# Patient Record
Sex: FEMALE | Race: WHITE | NOT HISPANIC OR LATINO | Employment: FULL TIME | URBAN - METROPOLITAN AREA
[De-identification: names, ages, dates, MRNs, and addresses within clinical notes are randomized per-mention and may not be internally consistent; named-entity substitution may affect disease eponyms.]

---

## 2022-04-26 ENCOUNTER — APPOINTMENT (INPATIENT)
Dept: MRI IMAGING | Facility: HOSPITAL | Age: 27
DRG: 263 | End: 2022-04-26
Payer: COMMERCIAL

## 2022-04-26 ENCOUNTER — HOSPITAL ENCOUNTER (INPATIENT)
Facility: HOSPITAL | Age: 27
LOS: 3 days | Discharge: HOME | DRG: 263 | End: 2022-04-29
Attending: EMERGENCY MEDICINE | Admitting: INTERNAL MEDICINE
Payer: COMMERCIAL

## 2022-04-26 ENCOUNTER — APPOINTMENT (EMERGENCY)
Dept: ULTRASOUND IMAGING | Facility: HOSPITAL | Age: 27
DRG: 263 | End: 2022-04-26
Payer: COMMERCIAL

## 2022-04-26 DIAGNOSIS — K80.20 CHOLELITHIASIS: ICD-10-CM

## 2022-04-26 DIAGNOSIS — K81.9 CHOLECYSTITIS: Primary | ICD-10-CM

## 2022-04-26 DIAGNOSIS — R74.01 TRANSAMINITIS: ICD-10-CM

## 2022-04-26 DIAGNOSIS — E80.6 HYPERBILIRUBINEMIA: ICD-10-CM

## 2022-04-26 DIAGNOSIS — K80.50 CHOLEDOCHOLITHIASIS: ICD-10-CM

## 2022-04-26 PROBLEM — R10.9 ABDOMINAL PAIN: Status: ACTIVE | Noted: 2022-04-26

## 2022-04-26 LAB
ALBUMIN SERPL BCP-MCNC: 4 G/DL (ref 3.5–5)
ALP SERPL-CCNC: 205 U/L (ref 46–116)
ALT SERPL W P-5'-P-CCNC: 299 U/L (ref 12–78)
ANION GAP SERPL CALCULATED.3IONS-SCNC: 9 MMOL/L (ref 4–13)
AST SERPL W P-5'-P-CCNC: 80 U/L (ref 5–45)
BACTERIA UR QL AUTO: ABNORMAL /HPF
BASOPHILS # BLD AUTO: 0.03 THOUSANDS/ΜL (ref 0–0.1)
BASOPHILS NFR BLD AUTO: 1 % (ref 0–1)
BILIRUB SERPL-MCNC: 6.71 MG/DL (ref 0.2–1)
BILIRUB UR QL STRIP: ABNORMAL
BUN SERPL-MCNC: 6 MG/DL (ref 5–25)
CALCIUM SERPL-MCNC: 8.7 MG/DL (ref 8.3–10.1)
CHLORIDE SERPL-SCNC: 102 MMOL/L (ref 100–108)
CLARITY UR: ABNORMAL
CO2 SERPL-SCNC: 26 MMOL/L (ref 21–32)
COLOR UR: YELLOW
CREAT SERPL-MCNC: 0.75 MG/DL (ref 0.6–1.3)
EOSINOPHIL # BLD AUTO: 0.16 THOUSAND/ΜL (ref 0–0.61)
EOSINOPHIL NFR BLD AUTO: 3 % (ref 0–6)
ERYTHROCYTE [DISTWIDTH] IN BLOOD BY AUTOMATED COUNT: 12.9 % (ref 11.6–15.1)
EXT PREG TEST URINE: NORMAL
EXT. CONTROL ED NAV: NORMAL
FINE GRAN CASTS URNS QL MICRO: ABNORMAL /LPF
GFR SERPL CREATININE-BSD FRML MDRD: 108 ML/MIN/1.73SQ M
GLUCOSE SERPL-MCNC: 95 MG/DL (ref 65–140)
GLUCOSE UR STRIP-MCNC: NEGATIVE MG/DL
HCT VFR BLD AUTO: 42.1 % (ref 34.8–46.1)
HGB BLD-MCNC: 14.1 G/DL (ref 11.5–15.4)
HGB UR QL STRIP.AUTO: ABNORMAL
IMM GRANULOCYTES # BLD AUTO: 0.02 THOUSAND/UL (ref 0–0.2)
IMM GRANULOCYTES NFR BLD AUTO: 0 % (ref 0–2)
KETONES UR STRIP-MCNC: ABNORMAL MG/DL
LEUKOCYTE ESTERASE UR QL STRIP: NEGATIVE
LIPASE SERPL-CCNC: 87 U/L (ref 73–393)
LYMPHOCYTES # BLD AUTO: 1.1 THOUSANDS/ΜL (ref 0.6–4.47)
LYMPHOCYTES NFR BLD AUTO: 20 % (ref 14–44)
MCH RBC QN AUTO: 32 PG (ref 26.8–34.3)
MCHC RBC AUTO-ENTMCNC: 33.5 G/DL (ref 31.4–37.4)
MCV RBC AUTO: 96 FL (ref 82–98)
MONOCYTES # BLD AUTO: 0.37 THOUSAND/ΜL (ref 0.17–1.22)
MONOCYTES NFR BLD AUTO: 7 % (ref 4–12)
MUCOUS THREADS UR QL AUTO: ABNORMAL
NEUTROPHILS # BLD AUTO: 3.93 THOUSANDS/ΜL (ref 1.85–7.62)
NEUTS SEG NFR BLD AUTO: 69 % (ref 43–75)
NITRITE UR QL STRIP: NEGATIVE
NON-SQ EPI CELLS URNS QL MICRO: ABNORMAL /HPF
NRBC BLD AUTO-RTO: 0 /100 WBCS
PH UR STRIP.AUTO: 5.5 [PH] (ref 4.5–8)
PLATELET # BLD AUTO: 248 THOUSANDS/UL (ref 149–390)
PMV BLD AUTO: 10.1 FL (ref 8.9–12.7)
POTASSIUM SERPL-SCNC: 3.4 MMOL/L (ref 3.5–5.3)
PROT SERPL-MCNC: 7.9 G/DL (ref 6.4–8.2)
PROT UR STRIP-MCNC: NEGATIVE MG/DL
RBC # BLD AUTO: 4.4 MILLION/UL (ref 3.81–5.12)
RBC #/AREA URNS AUTO: ABNORMAL /HPF
SODIUM SERPL-SCNC: 137 MMOL/L (ref 136–145)
SP GR UR STRIP.AUTO: >=1.03 (ref 1–1.03)
UROBILINOGEN UR QL STRIP.AUTO: 0.2 E.U./DL
WBC # BLD AUTO: 5.61 THOUSAND/UL (ref 4.31–10.16)
WBC #/AREA URNS AUTO: ABNORMAL /HPF

## 2022-04-26 PROCEDURE — 99285 EMERGENCY DEPT VISIT HI MDM: CPT

## 2022-04-26 PROCEDURE — 81001 URINALYSIS AUTO W/SCOPE: CPT

## 2022-04-26 PROCEDURE — 96375 TX/PRO/DX INJ NEW DRUG ADDON: CPT

## 2022-04-26 PROCEDURE — 99285 EMERGENCY DEPT VISIT HI MDM: CPT | Performed by: EMERGENCY MEDICINE

## 2022-04-26 PROCEDURE — 83690 ASSAY OF LIPASE: CPT | Performed by: EMERGENCY MEDICINE

## 2022-04-26 PROCEDURE — 36415 COLL VENOUS BLD VENIPUNCTURE: CPT | Performed by: EMERGENCY MEDICINE

## 2022-04-26 PROCEDURE — 96361 HYDRATE IV INFUSION ADD-ON: CPT

## 2022-04-26 PROCEDURE — 80074 ACUTE HEPATITIS PANEL: CPT | Performed by: SURGERY

## 2022-04-26 PROCEDURE — 96374 THER/PROPH/DIAG INJ IV PUSH: CPT

## 2022-04-26 PROCEDURE — 85025 COMPLETE CBC W/AUTO DIFF WBC: CPT | Performed by: EMERGENCY MEDICINE

## 2022-04-26 PROCEDURE — 74181 MRI ABDOMEN W/O CONTRAST: CPT

## 2022-04-26 PROCEDURE — 76705 ECHO EXAM OF ABDOMEN: CPT

## 2022-04-26 PROCEDURE — 80053 COMPREHEN METABOLIC PANEL: CPT | Performed by: EMERGENCY MEDICINE

## 2022-04-26 PROCEDURE — 81025 URINE PREGNANCY TEST: CPT | Performed by: EMERGENCY MEDICINE

## 2022-04-26 PROCEDURE — G1004 CDSM NDSC: HCPCS

## 2022-04-26 PROCEDURE — 99223 1ST HOSP IP/OBS HIGH 75: CPT | Performed by: INTERNAL MEDICINE

## 2022-04-26 RX ORDER — ONDANSETRON 2 MG/ML
4 INJECTION INTRAMUSCULAR; INTRAVENOUS EVERY 4 HOURS PRN
Status: DISCONTINUED | OUTPATIENT
Start: 2022-04-26 | End: 2022-04-29 | Stop reason: HOSPADM

## 2022-04-26 RX ORDER — OXYCODONE HYDROCHLORIDE 10 MG/1
10 TABLET ORAL EVERY 4 HOURS PRN
Status: DISCONTINUED | OUTPATIENT
Start: 2022-04-26 | End: 2022-04-29 | Stop reason: HOSPADM

## 2022-04-26 RX ORDER — POTASSIUM CHLORIDE 20 MEQ/1
40 TABLET, EXTENDED RELEASE ORAL ONCE
Status: DISCONTINUED | OUTPATIENT
Start: 2022-04-26 | End: 2022-04-26

## 2022-04-26 RX ORDER — SODIUM CHLORIDE 9 MG/ML
125 INJECTION, SOLUTION INTRAVENOUS CONTINUOUS
Status: DISCONTINUED | OUTPATIENT
Start: 2022-04-26 | End: 2022-04-29 | Stop reason: HOSPADM

## 2022-04-26 RX ORDER — ACETAMINOPHEN 325 MG/1
650 TABLET ORAL EVERY 6 HOURS PRN
Status: DISCONTINUED | OUTPATIENT
Start: 2022-04-26 | End: 2022-04-29 | Stop reason: HOSPADM

## 2022-04-26 RX ORDER — OXYCODONE HYDROCHLORIDE 5 MG/1
5 TABLET ORAL EVERY 4 HOURS PRN
Status: DISCONTINUED | OUTPATIENT
Start: 2022-04-26 | End: 2022-04-29 | Stop reason: HOSPADM

## 2022-04-26 RX ORDER — ONDANSETRON 2 MG/ML
4 INJECTION INTRAMUSCULAR; INTRAVENOUS ONCE
Status: COMPLETED | OUTPATIENT
Start: 2022-04-26 | End: 2022-04-26

## 2022-04-26 RX ORDER — DICYCLOMINE HCL 20 MG
20 TABLET ORAL ONCE
Status: COMPLETED | OUTPATIENT
Start: 2022-04-26 | End: 2022-04-26

## 2022-04-26 RX ORDER — MORPHINE SULFATE 4 MG/ML
4 INJECTION, SOLUTION INTRAMUSCULAR; INTRAVENOUS ONCE
Status: COMPLETED | OUTPATIENT
Start: 2022-04-26 | End: 2022-04-26

## 2022-04-26 RX ORDER — HYDROMORPHONE HCL/PF 1 MG/ML
0.5 SYRINGE (ML) INJECTION EVERY 4 HOURS PRN
Status: DISCONTINUED | OUTPATIENT
Start: 2022-04-26 | End: 2022-04-29 | Stop reason: HOSPADM

## 2022-04-26 RX ORDER — KETOROLAC TROMETHAMINE 30 MG/ML
15 INJECTION, SOLUTION INTRAMUSCULAR; INTRAVENOUS ONCE
Status: COMPLETED | OUTPATIENT
Start: 2022-04-26 | End: 2022-04-26

## 2022-04-26 RX ORDER — POTASSIUM CHLORIDE 14.9 MG/ML
20 INJECTION INTRAVENOUS ONCE
Status: COMPLETED | OUTPATIENT
Start: 2022-04-26 | End: 2022-04-27

## 2022-04-26 RX ADMIN — SODIUM CHLORIDE 1000 ML: 0.9 INJECTION, SOLUTION INTRAVENOUS at 19:48

## 2022-04-26 RX ADMIN — KETOROLAC TROMETHAMINE 15 MG: 30 INJECTION, SOLUTION INTRAMUSCULAR at 19:53

## 2022-04-26 RX ADMIN — POTASSIUM CHLORIDE 20 MEQ: 14.9 INJECTION, SOLUTION INTRAVENOUS at 23:44

## 2022-04-26 RX ADMIN — MORPHINE SULFATE 4 MG: 4 INJECTION INTRAVENOUS at 20:36

## 2022-04-26 RX ADMIN — SODIUM CHLORIDE 125 ML/HR: 0.9 INJECTION, SOLUTION INTRAVENOUS at 22:22

## 2022-04-26 RX ADMIN — FAMOTIDINE 20 MG: 10 INJECTION INTRAVENOUS at 19:53

## 2022-04-26 RX ADMIN — PIPERACILLIN SODIUM AND TAZOBACTAM SODIUM 3.38 G: 36; 4.5 INJECTION, POWDER, LYOPHILIZED, FOR SOLUTION INTRAVENOUS at 22:59

## 2022-04-26 RX ADMIN — DICYCLOMINE HYDROCHLORIDE 20 MG: 20 TABLET ORAL at 19:53

## 2022-04-26 RX ADMIN — ONDANSETRON 4 MG: 2 INJECTION INTRAMUSCULAR; INTRAVENOUS at 19:53

## 2022-04-26 NOTE — ED PROVIDER NOTES
History  Chief Complaint   Patient presents with    Abdominal Pain     Reports abdominal cramping starting friday with N/V/D  States starting today pain moved to [place]  26 yo F presenting for evaluation of abdominal pain  Pt states she was sick the past few days with N/V that improved yesterday and was able to go to work  Last night, started with numerous episodes of diarrhea  Pain has now localized to RUQ with radiation to R mid back  Vomiting has resolved and has been tolerating po, but still nauseous  Decreased appetite  Denies fevers, chills, URI sx, CP, SOB, urinary complaints  Unknown LMP, has IUD  No SHx  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             None       History reviewed  No pertinent past medical history  History reviewed  No pertinent surgical history  History reviewed  No pertinent family history  I have reviewed and agree with the history as documented  E-Cigarette/Vaping     E-Cigarette/Vaping Substances     Social History     Tobacco Use    Smoking status: Never Smoker    Smokeless tobacco: Never Used   Substance Use Topics    Alcohol use: Yes     Comment: socially    Drug use: Not on file       Review of Systems   Constitutional: Negative for chills, fever and unexpected weight change  HENT: Negative for ear pain, rhinorrhea and sore throat  Eyes: Negative for pain and visual disturbance  Respiratory: Negative for cough and shortness of breath  Cardiovascular: Negative for chest pain and leg swelling  Gastrointestinal: Positive for abdominal pain, diarrhea, nausea and vomiting  Negative for constipation  Endocrine: Negative for polydipsia, polyphagia and polyuria  Genitourinary: Negative for dysuria, frequency, hematuria and urgency  Musculoskeletal: Negative for back pain, myalgias and neck pain  Skin: Negative for color change and rash     Allergic/Immunologic: Negative for environmental allergies and immunocompromised state    Neurological: Negative for dizziness, weakness, light-headedness, numbness and headaches  Hematological: Negative for adenopathy  Does not bruise/bleed easily  Psychiatric/Behavioral: Negative for agitation and confusion  All other systems reviewed and are negative  Physical Exam  Physical Exam  Vitals and nursing note reviewed  Constitutional:       General: She is not in acute distress  Appearance: She is well-developed  HENT:      Head: Normocephalic and atraumatic  Nose: Nose normal    Eyes:      Conjunctiva/sclera: Conjunctivae normal    Cardiovascular:      Rate and Rhythm: Normal rate and regular rhythm  Heart sounds: Normal heart sounds  Pulmonary:      Effort: Pulmonary effort is normal  No respiratory distress  Breath sounds: Normal breath sounds  No stridor  No wheezing or rales  Chest:      Chest wall: No tenderness  Abdominal:      General: There is no distension  Palpations: Abdomen is soft  Tenderness: There is abdominal tenderness in the right upper quadrant  There is no right CVA tenderness, left CVA tenderness, guarding or rebound  Musculoskeletal:         General: No deformity  Cervical back: Normal range of motion and neck supple  Skin:     General: Skin is warm and dry  Findings: No rash  Neurological:      Mental Status: She is alert and oriented to person, place, and time  Motor: No abnormal muscle tone  Coordination: Coordination normal    Psychiatric:         Thought Content:  Thought content normal          Judgment: Judgment normal          Vital Signs  ED Triage Vitals   Temperature Pulse Respirations Blood Pressure SpO2   04/26/22 1846 04/26/22 1844 04/26/22 1844 04/26/22 1844 04/26/22 1844   98 3 °F (36 8 °C) 95 16 135/79 100 %      Temp Source Heart Rate Source Patient Position - Orthostatic VS BP Location FiO2 (%)   04/26/22 1846 04/26/22 1844 04/26/22 1844 04/26/22 1844 --   Oral Monitor Sitting Left arm       Pain Score       04/26/22 2124       2           Vitals:    04/26/22 1844 04/26/22 2039 04/26/22 2041 04/26/22 2123   BP: 135/79 118/56 115/58 113/54   Pulse: 95 78  67   Patient Position - Orthostatic VS: Sitting   Lying         Visual Acuity      ED Medications  Medications   sodium chloride 0 9 % infusion (has no administration in time range)   acetaminophen (TYLENOL) tablet 650 mg (has no administration in time range)   ondansetron (ZOFRAN) injection 4 mg (has no administration in time range)   oxyCODONE (ROXICODONE) IR tablet 5 mg (has no administration in time range)   oxyCODONE (ROXICODONE) immediate release tablet 10 mg (has no administration in time range)   HYDROmorphone (DILAUDID) injection 0 5 mg (has no administration in time range)   potassium chloride (K-DUR,KLOR-CON) CR tablet 40 mEq (has no administration in time range)   influenza vaccine, quadrivalent (FLUARIX) IM injection 0 5 mL (has no administration in time range)   piperacillin-tazobactam (ZOSYN) 3 375 g in sodium chloride 0 9 % 100 mL IVPB (has no administration in time range)   sodium chloride 0 9 % bolus 1,000 mL (1,000 mL Intravenous New Bag 4/26/22 1948)   ketorolac (TORADOL) injection 15 mg (15 mg Intravenous Given 4/26/22 1953)   ondansetron (ZOFRAN) injection 4 mg (4 mg Intravenous Given 4/26/22 1953)   famotidine (PEPCID) injection 20 mg (20 mg Intravenous Given 4/26/22 1953)   dicyclomine (BENTYL) tablet 20 mg (20 mg Oral Given 4/26/22 1953)   morphine (PF) 4 mg/mL injection 4 mg (4 mg Intravenous Given 4/26/22 2036)       Diagnostic Studies  Results Reviewed     Procedure Component Value Units Date/Time    Comprehensive metabolic panel [[id number]]  (Abnormal) Collected: 04/26/22 1953    Lab Status: Final result Specimen: Blood from Arm, Left Updated: 04/26/22 2022     Sodium 137 mmol/L      Potassium 3 4 mmol/L      Chloride 102 mmol/L      CO2 26 mmol/L      ANION GAP 9 mmol/L      BUN 6 mg/dL      Creatinine 0 75 mg/dL Glucose 95 mg/dL      Calcium 8 7 mg/dL      AST 80 U/L       U/L      Alkaline Phosphatase 205 U/L      Total Protein 7 9 g/dL      Albumin 4 0 g/dL      Total Bilirubin 6 71 mg/dL      eGFR 108 ml/min/1 73sq m     Narrative:      [place] guidelines for Chronic Kidney Disease (CKD):     Stage 1 with normal or high GFR (GFR > 90 mL/min/1 73 square meters)    Stage 2 Mild CKD (GFR = 60-89 mL/min/1 73 square meters)    Stage 3A Moderate CKD (GFR = 45-59 mL/min/1 73 square meters)    Stage 3B Moderate CKD (GFR = 30-44 mL/min/1 73 square meters)    Stage 4 Severe CKD (GFR = 15-29 mL/min/1 73 square meters)    Stage 5 End Stage CKD (GFR <15 mL/min/1 73 square meters)  Note: GFR calculation is accurate only with a steady state creatinine    Lipase [[id number]]  (Normal) Collected: 04/26/22 1953    Lab Status: Final result Specimen: Blood from Arm, Left Updated: 04/26/22 2022     Lipase 87 u/L     CBC and differential [[id number]] Collected: 04/26/22 1953    Lab Status: Final result Specimen: Blood from Arm, Left Updated: 04/26/22 2003     WBC 5 61 Thousand/uL      RBC 4 40 Million/uL      Hemoglobin 14 1 g/dL      Hematocrit 42 1 %      MCV 96 fL      MCH 32 0 pg      MCHC 33 5 g/dL      RDW 12 9 %      MPV 10 1 fL      Platelets 829 Thousands/uL      nRBC 0 /100 WBCs      Neutrophils Relative 69 %      Immat GRANS % 0 %      Lymphocytes Relative 20 %      Monocytes Relative 7 %      Eosinophils Relative 3 %      Basophils Relative 1 %      Neutrophils Absolute 3 93 Thousands/µL      Immature Grans Absolute 0 02 Thousand/uL      Lymphocytes Absolute 1 10 Thousands/µL      Monocytes Absolute 0 37 Thousand/µL      Eosinophils Absolute 0 16 Thousand/µL      Basophils Absolute 0 03 Thousands/µL     Urine Microscopic [[id number]]  (Abnormal) Collected: 04/26/22 1900    Lab Status: Final result Specimen: Urine, Clean Catch Updated: 04/26/22 1940     RBC, UA 1-2 /hpf      WBC, UA 4-10 /hpf      Epithelial Cells Moderate /hpf      Bacteria, UA Moderate /hpf      Fine granular casts 0-1 /lpf      MUCUS THREADS Moderate    POCT pregnancy, urine [[id number]]  (Normal) Resulted: 04/26/22 1902    Lab Status: Final result Updated: 04/26/22 1903     EXT PREG TEST UR (Ref: Negative) NEG     Control VALID    Urine Macroscopic, POC [[id number]]  (Abnormal) Collected: 04/26/22 1900    Lab Status: Final result Specimen: Urine Updated: 04/26/22 1901     Color, UA Yellow     Clarity, UA Cloudy     pH, UA 5 5     Leukocytes, UA Negative     Nitrite, UA Negative     Protein, UA Negative mg/dl      Glucose, UA Negative mg/dl      Ketones, UA Trace mg/dl      Urobilinogen, UA 0 2 E U /dl      Bilirubin, UA Interference- unable to analyze     Blood, UA Small     Specific Ora, UA >=1 030    Narrative:      CLINITEK RESULT                 US right upper quadrant   ED Interpretation by Liam Petersen DO (04/26 2022)   Cholelithiasis with positive sonographic Arriaza sign and suspected mild intrahepatic biliary ductal dilatation  Findings may represent acute cholecystitis, correlation with nuclear medicine HIDA scan can be considered for further assessment  Correlation with patient's LFTs is advised  Mild increased echogenicity of the liver most consistent with mild hepatic steatosis  Final Result by Cirilo Kwon MD (04/26 2018)      Cholelithiasis with positive sonographic Arriaza sign and suspected mild intrahepatic biliary ductal dilatation  Findings may represent acute cholecystitis, correlation with nuclear medicine HIDA scan can be considered for further assessment  Correlation with patient's LFTs is advised  Mild increased echogenicity of the liver most consistent with mild hepatic steatosis        Workstation performed: [id number]         MRI inpatient order    (Results Pending)              Procedures  Procedures         ED Course  ED Course as of 04/26/22 2210 Tue Apr 26, 2022   190 PREGNANCY TEST URINE: NEG   1928 Pt is difficult stick, no labs/IV yet, pt currently getting [address] [place] [address], UA(!): Moderate   1942 WBC, UA(!): 4-10   2022 AST(!): 80   2022 ALT(!): 299   2022 Alkaline Phosphatase(!): 205   2022 Cholelithiasis with positive sonographic Arriaza sign and suspected mild intrahepatic biliary ductal dilatation  Findings may represent acute cholecystitis, correlation with nuclear medicine HIDA scan can be considered for further assessment  Correlation with patient's LFTs is advised  Mild increased echogenicity of the liver most consistent with mild hepatic steatosis  2029 Pt updated regarding results, still having pain, nausea resolved   Messaged surgery   2036 Surgery recommends admitting to [facility] for GI consult for possible MRCP/ERCP                               SBIRT 20yo+      Most Recent Value   SBIRT (23 yo +)    In order to provide better care to our patients, we are screening all of our patients for alcohol and drug use  Would it be okay to ask you these screening questions?  No Filed at: 04/26/2022 2013                    [facility]  Number of Diagnoses or Management Options  Cholecystitis  Cholelithiasis  Hyperbilirubinemia  Transaminitis  Diagnosis management comments: 28 yo F with RUQ abdominal pain/N/V, found to have elevated LFTs/bili and US concerning for cholelithiasis/cholecystitis  Case discussed with general surgery who recommended [facility] admission and GI consult for possible MRCP/ERCP       Amount and/or Complexity of Data Reviewed  Clinical lab tests: ordered and reviewed  Tests in the radiology section of CPT®: ordered and reviewed  Tests in the medicine section of CPT®: ordered and reviewed  Review and summarize past medical records: yes  Discuss the patient with other providers: yes (General surgery, Kevan Son)  Independent visualization of images, tracings, or specimens: yes        Disposition  Final diagnoses:   Cholecystitis   Cholelithiasis Transaminitis   Hyperbilirubinemia     Time reflects when diagnosis was documented in both MDM as applicable and the Disposition within this note     Time User Action Codes Description Comment    4/26/2022  8:30 PM Erla Mikaela Add [K81 9] Cholecystitis     4/26/2022  8:30 PM Erla Mikaela Add [K80 20] Cholelithiasis     4/26/2022  8:45 PM Minetta Duane A Add [R74 01] Transaminitis     4/26/2022  8:45 PM Minetta Duane A Add [E80 6] Hyperbilirubinemia       ED Disposition     ED Disposition Condition Date/Time Comment    Admit Stable Tue Apr 26, 2022  8:45 PM Case was discussed with DENNIS and the patient's admission status was agreed to be Admission Status: inpatient status to the service of Dr Amara Higgins   Follow-up Information    None         There are no discharge medications for this patient  No discharge procedures on file      PDMP Review     None          ED Provider  Electronically Signed by           Gisella Churchill DO  04/26/22 2247

## 2022-04-27 ENCOUNTER — ANESTHESIA EVENT (INPATIENT)
Dept: GASTROENTEROLOGY | Facility: HOSPITAL | Age: 27
DRG: 263 | End: 2022-04-27
Payer: COMMERCIAL

## 2022-04-27 PROBLEM — E66.9 OBESITY (BMI 30-39.9): Chronic | Status: ACTIVE | Noted: 2022-04-27

## 2022-04-27 LAB
ALBUMIN SERPL BCP-MCNC: 3.4 G/DL (ref 3.5–5)
ALP SERPL-CCNC: 175 U/L (ref 46–116)
ALT SERPL W P-5'-P-CCNC: 271 U/L (ref 12–78)
ANION GAP SERPL CALCULATED.3IONS-SCNC: 14 MMOL/L (ref 4–13)
AST SERPL W P-5'-P-CCNC: 94 U/L (ref 5–45)
BASOPHILS # BLD AUTO: 0.02 THOUSANDS/ΜL (ref 0–0.1)
BASOPHILS NFR BLD AUTO: 0 % (ref 0–1)
BILIRUB SERPL-MCNC: 6.04 MG/DL (ref 0.2–1)
BUN SERPL-MCNC: 7 MG/DL (ref 5–25)
CALCIUM ALBUM COR SERPL-MCNC: 8.8 MG/DL (ref 8.3–10.1)
CALCIUM SERPL-MCNC: 8.3 MG/DL (ref 8.3–10.1)
CHLORIDE SERPL-SCNC: 105 MMOL/L (ref 100–108)
CO2 SERPL-SCNC: 21 MMOL/L (ref 21–32)
CREAT SERPL-MCNC: 0.74 MG/DL (ref 0.6–1.3)
EOSINOPHIL # BLD AUTO: 0.15 THOUSAND/ΜL (ref 0–0.61)
EOSINOPHIL NFR BLD AUTO: 3 % (ref 0–6)
ERYTHROCYTE [DISTWIDTH] IN BLOOD BY AUTOMATED COUNT: 13.1 % (ref 11.6–15.1)
GFR SERPL CREATININE-BSD FRML MDRD: 110 ML/MIN/1.73SQ M
GLUCOSE SERPL-MCNC: 77 MG/DL (ref 65–140)
HCT VFR BLD AUTO: 37.7 % (ref 34.8–46.1)
HGB BLD-MCNC: 12.2 G/DL (ref 11.5–15.4)
IMM GRANULOCYTES # BLD AUTO: 0.02 THOUSAND/UL (ref 0–0.2)
IMM GRANULOCYTES NFR BLD AUTO: 0 % (ref 0–2)
INR PPP: 0.98 (ref 0.84–1.19)
LYMPHOCYTES # BLD AUTO: 1.35 THOUSANDS/ΜL (ref 0.6–4.47)
LYMPHOCYTES NFR BLD AUTO: 27 % (ref 14–44)
MCH RBC QN AUTO: 30.5 PG (ref 26.8–34.3)
MCHC RBC AUTO-ENTMCNC: 32.4 G/DL (ref 31.4–37.4)
MCV RBC AUTO: 94 FL (ref 82–98)
MONOCYTES # BLD AUTO: 0.38 THOUSAND/ΜL (ref 0.17–1.22)
MONOCYTES NFR BLD AUTO: 8 % (ref 4–12)
NEUTROPHILS # BLD AUTO: 3.07 THOUSANDS/ΜL (ref 1.85–7.62)
NEUTS SEG NFR BLD AUTO: 62 % (ref 43–75)
NRBC BLD AUTO-RTO: 0 /100 WBCS
PLATELET # BLD AUTO: 236 THOUSANDS/UL (ref 149–390)
PMV BLD AUTO: 10.7 FL (ref 8.9–12.7)
POTASSIUM SERPL-SCNC: 3.2 MMOL/L (ref 3.5–5.3)
PROT SERPL-MCNC: 6.8 G/DL (ref 6.4–8.2)
PROTHROMBIN TIME: 12.8 SECONDS (ref 11.6–14.5)
RBC # BLD AUTO: 4 MILLION/UL (ref 3.81–5.12)
SODIUM SERPL-SCNC: 140 MMOL/L (ref 136–145)
WBC # BLD AUTO: 4.99 THOUSAND/UL (ref 4.31–10.16)

## 2022-04-27 PROCEDURE — 90686 IIV4 VACC NO PRSV 0.5 ML IM: CPT | Performed by: INTERNAL MEDICINE

## 2022-04-27 PROCEDURE — 85025 COMPLETE CBC W/AUTO DIFF WBC: CPT | Performed by: INTERNAL MEDICINE

## 2022-04-27 PROCEDURE — 85610 PROTHROMBIN TIME: CPT | Performed by: INTERNAL MEDICINE

## 2022-04-27 PROCEDURE — 99254 IP/OBS CNSLTJ NEW/EST MOD 60: CPT | Performed by: INTERNAL MEDICINE

## 2022-04-27 PROCEDURE — 99232 SBSQ HOSP IP/OBS MODERATE 35: CPT | Performed by: INTERNAL MEDICINE

## 2022-04-27 PROCEDURE — 80053 COMPREHEN METABOLIC PANEL: CPT | Performed by: INTERNAL MEDICINE

## 2022-04-27 PROCEDURE — ND001 PR NO DOCUMENTATION: Performed by: SURGERY

## 2022-04-27 PROCEDURE — 90471 IMMUNIZATION ADMIN: CPT | Performed by: INTERNAL MEDICINE

## 2022-04-27 RX ORDER — ONDANSETRON 2 MG/ML
4 INJECTION INTRAMUSCULAR; INTRAVENOUS ONCE
Status: DISCONTINUED | OUTPATIENT
Start: 2022-04-27 | End: 2022-04-29 | Stop reason: HOSPADM

## 2022-04-27 RX ADMIN — OXYCODONE HYDROCHLORIDE 10 MG: 10 TABLET ORAL at 09:11

## 2022-04-27 RX ADMIN — ONDANSETRON 4 MG: 2 INJECTION INTRAMUSCULAR; INTRAVENOUS at 17:34

## 2022-04-27 RX ADMIN — SODIUM CHLORIDE 125 ML/HR: 0.9 INJECTION, SOLUTION INTRAVENOUS at 11:16

## 2022-04-27 RX ADMIN — PIPERACILLIN SODIUM AND TAZOBACTAM SODIUM 3.38 G: 36; 4.5 INJECTION, POWDER, LYOPHILIZED, FOR SOLUTION INTRAVENOUS at 21:35

## 2022-04-27 RX ADMIN — OXYCODONE HYDROCHLORIDE 5 MG: 5 TABLET ORAL at 20:17

## 2022-04-27 RX ADMIN — OXYCODONE HYDROCHLORIDE 10 MG: 10 TABLET ORAL at 02:42

## 2022-04-27 RX ADMIN — ONDANSETRON 4 MG: 2 INJECTION INTRAMUSCULAR; INTRAVENOUS at 07:14

## 2022-04-27 RX ADMIN — SODIUM CHLORIDE 125 ML/HR: 0.9 INJECTION, SOLUTION INTRAVENOUS at 21:36

## 2022-04-27 RX ADMIN — PIPERACILLIN SODIUM AND TAZOBACTAM SODIUM 3.38 G: 36; 4.5 INJECTION, POWDER, LYOPHILIZED, FOR SOLUTION INTRAVENOUS at 04:08

## 2022-04-27 RX ADMIN — INFLUENZA VIRUS VACCINE 0.5 ML: 15; 15; 15; 15 SUSPENSION INTRAMUSCULAR at 09:12

## 2022-04-27 RX ADMIN — ONDANSETRON 4 MG: 2 INJECTION INTRAMUSCULAR; INTRAVENOUS at 10:32

## 2022-04-27 RX ADMIN — OXYCODONE HYDROCHLORIDE 5 MG: 5 TABLET ORAL at 15:55

## 2022-04-27 RX ADMIN — PIPERACILLIN SODIUM AND TAZOBACTAM SODIUM 3.38 G: 36; 4.5 INJECTION, POWDER, LYOPHILIZED, FOR SOLUTION INTRAVENOUS at 09:34

## 2022-04-27 RX ADMIN — PIPERACILLIN SODIUM AND TAZOBACTAM SODIUM 3.38 G: 36; 4.5 INJECTION, POWDER, LYOPHILIZED, FOR SOLUTION INTRAVENOUS at 15:54

## 2022-04-27 RX ADMIN — ONDANSETRON 4 MG: 2 INJECTION INTRAMUSCULAR; INTRAVENOUS at 21:35

## 2022-04-27 RX ADMIN — ONDANSETRON 4 MG: 2 INJECTION INTRAMUSCULAR; INTRAVENOUS at 00:50

## 2022-04-27 NOTE — UTILIZATION REVIEW
Initial Clinical Review    Admission: Date/Time/Statement:   Admission Orders (From admission, onward)     Ordered        04/26/22 2045  Inpatient Admission  Once                      Orders Placed This Encounter   Procedures    Inpatient Admission     Standing Status:   Standing     Number of Occurrences:   1     Order Specific Question:   Level of Care     Answer:   Med Surg [16]     Order Specific Question:   Estimated length of stay     Answer:   More than 2 Midnights     Order Specific Question:   Certification     Answer:   I certify that inpatient services are medically necessary for this patient for a duration of greater than two midnights  See H&P and MD Progress Notes for additional information about the patient's course of treatment  ED Arrival Information     Expected Arrival Acuity    - 4/26/2022 18:37 Urgent         Means of arrival Escorted by Service Admission type    BRAVO GODFREY  [facility] Hospitalist Urgent         Arrival complaint    Abdominal pain, Diarrhea, Vomiting, Back pain        Chief Complaint   Patient presents with    Abdominal Pain     Reports abdominal cramping starting friday with N/V/D  States starting today pain moved to [place]  Initial Presentation: 27 y o  female presents to ED from home with an acute onset of abdominal pain  Felt she  Came down with a  Stomach bug  Symptoms progressed and she  Developed RUQ pain, nausea, vomiting and diarrhea  Several family members with same  Symptoms  U/S  Reveals cholelithiasis  Labs show elevated  LFT's,  Total bili  6 7  Admit  Ip with Abdominal pain, transaminitis and plan is  GI/surgery consults, monitor labs, NPO,  WILL,  IVF and  Pain control  Date:    4/27       Day 2:   GI consult  Found with acute calculus cholecystitis, transaminitis and biliary dilatation  Continue pain control and antiemetics as needed  Continue   IVF/WILL  Surgery consult  Wait  MRCP to further evaluate    Continue  Current  Treatment at present  MRCP was just read and it does show "Choledocholithiasis and cholelithiasis with mild-to-moderate intrahepatic biliary ductal dilatation " I discussed findings with pt, who is agreeable to ERCP tomorrow  Pt is asking to trial clear liquids today; NPO midnight  ED Triage Vitals   Temperature Pulse Respirations Blood Pressure SpO2   04/26/22 1846 04/26/22 1844 04/26/22 1844 04/26/22 1844 04/26/22 1844   98 3 °F (36 8 °C) 95 16 135/79 100 %      Temp Source Heart Rate Source Patient Position - Orthostatic VS BP Location FiO2 (%)   04/26/22 1846 04/26/22 1844 04/26/22 1844 04/26/22 1844 --   Oral Monitor Sitting Left arm       Pain Score       04/26/22 2124       2          Wt Readings from Last 1 Encounters:   04/26/22 99 9 kg (220 lb 3 8 oz)     Additional Vital Signs:   97 9 °F (36 6 °C) 63 16 124/59 85 99 % None (Room air) Lying    04/26/22 2300 97 8 °F (36 6 °C) 65 20 110/53 -- 99 % None (Room air) Lying   04/26/22 2123 -- 67 18 113/54 -- 99 % None (Room air) Lying   04/26/22 2041 -- -- -- 115/58 -- -- -- --   04/26/22 2039 -- 78 18 118/56 -- 100 % -- --   04/26/22 1846 98 3 °F (36 8 °C) -- -- -- -- -- -- --   04/26/22 1844 -- 95 16 135/79 -- 100 % None (Room air) Sitting       Pertinent Labs/Diagnostic Test Results:   US right upper quadrant   ED Interpretation by Collin Lanes, DO (04/26 2022)   Cholelithiasis with positive sonographic Arriaza sign and suspected mild intrahepatic biliary ductal dilatation  Findings may represent acute cholecystitis, correlation with nuclear medicine HIDA scan can be considered for further assessment  Correlation with patient's LFTs is advised  Mild increased echogenicity of the liver most consistent with mild hepatic steatosis  Final Result by oJo Wharton MD (04/26 2018)      Cholelithiasis with positive sonographic Arriaza sign and suspected mild intrahepatic biliary ductal dilatation    Findings may represent acute cholecystitis, correlation with nuclear medicine HIDA scan can be considered for further assessment  Correlation with patient's LFTs is advised  Mild increased echogenicity of the liver most consistent with mild hepatic steatosis        Workstation performed: [id number]         MRI abdomen wo contrast and mrcp    (Results Pending)         Results from last 7 days   Lab Units 04/27/22 0423 04/26/22 1953   WBC Thousand/uL 4 99 5 61   HEMOGLOBIN g/dL 12 2 14 1   HEMATOCRIT % 37 7 42 1   PLATELETS Thousands/uL 236 248   NEUTROS ABS Thousands/µL 3 07 3 93         Results from last 7 days   Lab Units 04/27/22 0423 04/26/22 1953   SODIUM mmol/L 140 137   POTASSIUM mmol/L 3 2* 3 4*   CHLORIDE mmol/L 105 102   CO2 mmol/L 21 26   ANION GAP mmol/L 14* 9   BUN mg/dL 7 6   CREATININE mg/dL 0 74 0 75   EGFR ml/min/1 73sq m 110 108   CALCIUM mg/dL 8 3 8 7     Results from last 7 days   Lab Units 04/27/22 0423 04/26/22 1953   AST U/L 94* 80*   ALT U/L 271* 299*   ALK PHOS U/L 175* 205*   TOTAL PROTEIN g/dL 6 8 7 9   ALBUMIN g/dL 3 4* 4 0   TOTAL BILIRUBIN mg/dL 6 04* 6 71*         Results from last 7 days   Lab Units 04/27/22 0423 04/26/22 1953   GLUCOSE RANDOM mg/dL 77 95           Results from last 7 days   Lab Units 04/27/22 0423   PROTIME seconds 12 8   INR  0 98           Results from last 7 days   Lab Units 04/26/22 1953   LIPASE u/L 87                 Results from last 7 days   Lab Units 04/26/22  1900   CLARITY UA  Cloudy   COLOR UA  Yellow   SPEC GRAV UA  >=1 030   PH UA  5 5   GLUCOSE UA mg/dl Negative   KETONES UA mg/dl Trace*   BLOOD UA  Small*   PROTEIN UA mg/dl Negative   NITRITE UA  Negative   BILIRUBIN UA  Interference- unable to analyze*   UROBILINOGEN UA E U /dl 0 2   LEUKOCYTES UA  Negative   WBC UA /hpf 4-10*   RBC UA /hpf 1-2*   BACTERIA UA /hpf Moderate*   EPITHELIAL CELLS WET PREP /hpf Moderate*   MUCUS THREADS  Moderate*                 ED Treatment:   Medication Administration from 04/26/2022 1836 to 04/26/2022 2121       Date/Time Order Dose Route Action Comments     04/26/2022 1948 sodium chloride 0 9 % bolus 1,000 mL 1,000 mL Intravenous New Bag      04/26/2022 1953 ketorolac (TORADOL) injection 15 mg 15 mg Intravenous Given      04/26/2022 1953 ondansetron (ZOFRAN) injection 4 mg 4 mg Intravenous Given      04/26/2022 1953 famotidine (PEPCID) injection 20 mg 20 mg Intravenous Given      04/26/2022 1953 dicyclomine (BENTYL) tablet 20 mg 20 mg Oral Given      04/26/2022 2036 morphine (PF) 4 mg/mL injection 4 mg 4 mg Intravenous Given           Admitting Diagnosis: Cholelithiasis [K80 20]  Hyperbilirubinemia [E80 6]  Cholecystitis [K81 9]  Abdominal pain [R10 9]  Transaminitis [R74 01]  Age/Sex: 27 y o  female  Admission Orders:  Scheduled Medications:  piperacillin-tazobactam, 3 375 g, Intravenous, Q6H      Continuous IV Infusions:  sodium chloride, 125 mL/hr, Intravenous, Continuous      PRN Meds:  acetaminophen, 650 mg, Oral, Q6H PRN  HYDROmorphone, 0 5 mg, Intravenous, Q4H PRN  ondansetron, 4 mg, Intravenous, Q4H PRN   ( x2  4/27 thus far)  oxyCODONE, 10 mg, Oral, Q4H PRN  oxyCODONE, 5 mg, Oral, Q4H PRN        IP CONSULT TO ACUTE CARE SURGERY  IP CONSULT TO GASTROENTEROLOGY  IP CONSULT TO CASE MANAGEMENT    Network Utilization Review Department  ATTENTION: Please call with any questions or concerns to [phone] and carefully listen to the prompts so that you are directed to the right person  All voicemails are confidential   Laura Randle all requests for admission clinical reviews, approved or denied determinations and any other requests to dedicated fax number below belonging to the campus where the patient is receiving treatment   List of dedicated fax numbers for the Facilities:  [address] DENIALS (Administrative/Medical Necessity) [phone]   [address] [place] [address] (Maternity/NICU/Pediatrics) [phone]   5000 [facility] Carri Echavarria [phone]   8049 [facility] [place] [phone]   [address] [facility] [place] [address] [facility] [address] [place] [address] [address] [phone]   [address] [phone]

## 2022-04-27 NOTE — ASSESSMENT & PLAN NOTE
Patient presented with acute right upper quadrant abdominal pain, nausea vomiting diarrhea  Ultrasound with cholelithiasis with positive sonographic Arriaza sign, mild intrahepatic biliary ductal dilation, CBD measures 7 mm, no choledocholithiasis  Elevated AST/ALT/alk-phos, T bili 6 7, lipase 87  Case discussed with General surgery in emergency department-recommend GI evaluation for MRCP/ERCP  Continue NPO  Continue Zosyn  IV fluids, pain control  Trend LFTs

## 2022-04-27 NOTE — PROGRESS NOTES
Progress Note - Mckenna Mcnally 27 y o  female MRN: [id number]    Unit/Bed#: E2 -01 Encounter: [id number]      Subjective: The patient has had intermittent nausea  She has not vomited  She has right upper quadrant pain  She has no chest pain or shortness of breath  Physical Exam:   Temp:  [97 8 °F (36 6 °C)-98 3 °F (36 8 °C)] 97 9 °F (36 6 °C)  HR:  [63-95] 63  Resp:  [16-20] 16  BP: (110-135)/(53-79) 124/59    Gen:  Well-developed, obese, in no distress  Neck:  Supple  No lymphadenopathy, goiter, or bruit  Heart:  Regular rhythm  I heard no murmur, gallop, or rub  Lungs:  Clear to auscultation percussion  No wheezing, rales, or rhonchi  Abd:  Soft with active bowel sounds  Right upper quadrant tenderness is present  There is no mass or organomegaly  Extremities:  No clubbing, cyanosis, or edema  No calf tenderness  Neuro:  Alert and oriented  No focal sign  Skin:  Warm and dry  LABS:   CBC:   Lab Results   Component Value Date    WBC 4 99 04/27/2022    HGB 12 2 04/27/2022    HCT 37 7 04/27/2022    MCV 94 04/27/2022     04/27/2022    MCH 30 5 04/27/2022    MCHC 32 4 04/27/2022    RDW 13 1 04/27/2022    MPV 10 7 04/27/2022    NRBC 0 04/27/2022   , CMP:   Lab Results   Component Value Date    SODIUM 140 04/27/2022    K 3 2 (L) 04/27/2022     04/27/2022    CO2 21 04/27/2022    BUN 7 04/27/2022    CREATININE 0 74 04/27/2022    CALCIUM 8 3 04/27/2022    AST 94 (H) 04/27/2022     (H) 04/27/2022    ALKPHOS 175 (H) 04/27/2022    EGFR 110 04/27/2022           Patient Active Problem List   Diagnosis    Abdominal pain    Transaminitis       Assessment/Plan:  1  Acute cholecystitis  2  Choledocholithiasis  3  Mild hypokalemia  4  Obesity    The situation was discussed with Gastroenterology  The patient will be scheduled for ERCP  Hopefully, cholecystectomy can be accomplished expeditiously after ERCP is completed  The patient's potassium will be repleted      VTE Pharmacologic Prophylaxis: Enoxaparin (Lovenox)  VTE Mechanical Prophylaxis: sequential compression device

## 2022-04-27 NOTE — TREATMENT PLAN
Plan for laparoscopic cholecystectomy tomorrow, to follow ERCP with GI, if able to coordinate with OR scheduling  I spoke with OR charge and told her of our plans, and she stated she may be able to depending on timing of completion of the ERCP  Patient has been booked and consented for laparoscopic cholecystectomy for tomorrow  The patient is aware that we are attempting to coordinate this the same day and she understands that it may be pushed an extra day if we are unable to do so

## 2022-04-27 NOTE — UTILIZATION REVIEW
Inpatient Admission Authorization Request   NOTIFICATION OF INPATIENT ADMISSION/INPATIENT AUTHORIZATION REQUEST   SERVICING FACILITY:   [address] [place] [address], [place] [address] [address] [facility]  Tax ID: [id number]  NPI: [id number]  Place of Service: Inpatient [facility]  [address]  Place of Service Code: 24     ATTENDING PROVIDER:  Attending Name and NPI#: Nohemy Mercado Md [[id number]]  Address: [address] [place] [address], [place] [address] [address] [facility]  Phone: [phone]     UTILIZATION REVIEW CONTACT:  Jose Angel Bates Utilization   Network Utilization Review Department  Phone: [phone]  Fax: [phone]  Email: [email or web address]     PHYSICIAN ADVISORY SERVICES:  FOR [id number] REVIEW - MEDICAL NECESSITY DENIAL  Phone: [phone]  Fax: [phone]  Email: [email or web address]     TYPE OF REQUEST:  Inpatient Status     ADMISSION INFORMATION:  ADMISSION DATE/TIME: 4/26/22  8:45 PM  PATIENT DIAGNOSIS CODE/DESCRIPTION:  Cholelithiasis [K80 20]  Hyperbilirubinemia [E80 6]  Cholecystitis [K81 9]  Abdominal pain [R10 9]  Transaminitis [R74 01]  DISCHARGE DATE/TIME: No discharge date for patient encounter  IMPORTANT INFORMATION:  Please contact the Jose Angel Bates directly with any questions or concerns regarding this request  Department voicemails are confidential     Send requests for admission clinical reviews, concurrent reviews, approvals, and administrative denials due to lack of clinical to fax [phone]              Javier rBiceno RN   Registered Nurse   Specialty:  Utilization Review   Utilization Review       Addendum   Date of Service:  4/27/2022  9:51 AM                       Show:Clear all  [x]Manual[x]Template[x]Copied    Added by:  [x]Eri Dimas RN      []Fanny for details    Initial Clinical Review     Admission: Date/Time/Statement:       Admission Orders (From admission, onward)              Ordered          04/26/22 2045   Inpatient Admission  Once                                Orders Placed This Encounter   Procedures    Inpatient Admission       Standing Status:   Standing       Number of Occurrences:   1       Order Specific Question:   Level of Care       Answer:   Med Surg [16]       Order Specific Question:   Estimated length of stay       Answer:   More than 2 Midnights       Order Specific Question:   Certification       Answer:   I certify that inpatient services are medically necessary for this patient for a duration of greater than two midnights  See H&P and MD Progress Notes for additional information about the patient's course of treatment             ED Arrival Information      Expected Arrival Acuity     - 4/26/2022 18:37 Urgent           Means of arrival Escorted by Service Admission type     Walk-In Spouse Hospitalist Urgent           Arrival complaint     Abdominal pain, Diarrhea, Vomiting, Back pain               Chief Complaint   Patient presents with    Abdominal Pain       Reports abdominal cramping starting friday with N/V/D  States starting today pain moved to [place]           Initial Presentation: 27 y o  female presents to ED from home with an acute onset of abdominal pain  Felt she  Came down with a  Stomach bug  Symptoms progressed and she  Developed RUQ pain, nausea, vomiting and diarrhea  Several family members with same  Symptoms  U/S  Reveals cholelithiasis  Labs show elevated  LFT's,  Total bili  6 7  Admit  Ip with Abdominal pain, transaminitis and plan is  GI/surgery consults, monitor labs, NPO,  WILL,  IVF and  Pain control          Date:    4/27       Day 2:   GI consult  Found with acute calculus cholecystitis, transaminitis and biliary dilatation  Continue pain control and antiemetics as needed  Continue   IVF/WILL        Surgery consult  Wait  MRCP to further evaluate    Continue  Current  Treatment at present      MRCP was just read and it does show "Choledocholithiasis and cholelithiasis with mild-to-moderate intrahepatic biliary ductal dilatation " I discussed findings with pt, who is agreeable to ERCP tomorrow  Pt is asking to trial clear liquids today; NPO midnight                              ED Triage Vitals   Temperature Pulse Respirations Blood Pressure SpO2   04/26/22 1846 04/26/22 1844 04/26/22 1844 04/26/22 1844 04/26/22 1844   98 3 °F (36 8 °C) 95 16 135/79 100 %       Temp Source Heart Rate Source Patient Position - Orthostatic VS BP Location FiO2 (%)   04/26/22 1846 04/26/22 1844 04/26/22 1844 04/26/22 1844 --   Oral Monitor Sitting Left arm         Pain Score           04/26/22 2124           2                  Wt Readings from Last 1 Encounters:   04/26/22 99 9 kg (220 lb 3 8 oz)      Additional Vital Signs:   97 9 °F (36 6 °C) 63 16 124/59 85 99 % None (Room air) Lying     04/26/22 2300 97 8 °F (36 6 °C) 65 20 110/53 -- 99 % None (Room air) Lying   04/26/22 2123 -- 67 18 113/54 -- 99 % None (Room air) Lying   04/26/22 2041 -- -- -- 115/58 -- -- -- --   04/26/22 2039 -- 78 18 118/56 -- 100 % -- --   04/26/22 1846 98 3 °F (36 8 °C) -- -- -- -- -- -- --   04/26/22 1844 -- 95 16 135/79 -- 100 % None (Room air) Sitting         Pertinent Labs/Diagnostic Test Results:   US right upper quadrant   ED Interpretation by Lisa Medley DO (04/26 2022)   Cholelithiasis with positive sonographic Arriaza sign and suspected mild intrahepatic biliary ductal dilatation  Findings may represent acute cholecystitis, correlation with nuclear medicine HIDA scan can be considered for further assessment  Correlation with patient's LFTs is advised  Mild increased echogenicity of the liver most consistent with mild hepatic steatosis        Final Result by Austyn Chapman MD (04/26 2018)       Cholelithiasis with positive sonographic Arriaza sign and suspected mild intrahepatic biliary ductal dilatation    Findings may represent acute cholecystitis, correlation with nuclear medicine HIDA scan can be considered for further assessment  Correlation with patient's LFTs is advised     Mild increased echogenicity of the liver most consistent with mild hepatic steatosis        Workstation performed: [id number]           MRI abdomen wo contrast and mrcp    (Results Pending)                Results from last 7 days   Lab Units 04/27/22 0423 04/26/22 1953   WBC Thousand/uL 4 99 5 61   HEMOGLOBIN g/dL 12 2 14 1   HEMATOCRIT % 37 7 42 1   PLATELETS Thousands/uL 236 248   NEUTROS ABS Thousands/µL 3 07 3 93                Results from last 7 days   Lab Units 04/27/22 0423 04/26/22 1953   SODIUM mmol/L 140 137   POTASSIUM mmol/L 3 2* 3 4*   CHLORIDE mmol/L 105 102   CO2 mmol/L 21 26   ANION GAP mmol/L 14* 9   BUN mg/dL 7 6   CREATININE mg/dL 0 74 0 75   EGFR ml/min/1 73sq m 110 108   CALCIUM mg/dL 8 3 8 7            Results from last 7 days   Lab Units 04/27/22 0423 04/26/22 1953   AST U/L 94* 80*   ALT U/L 271* 299*   ALK PHOS U/L 175* 205*   TOTAL PROTEIN g/dL 6 8 7 9   ALBUMIN g/dL 3 4* 4 0   TOTAL BILIRUBIN mg/dL 6 04* 6 71*                Results from last 7 days   Lab Units 04/27/22 0423 04/26/22 1953   GLUCOSE RANDOM mg/dL 77 95                 Results from last 7 days   Lab Units 04/27/22 0423   PROTIME seconds 12 8   INR   0 98                 Results from last 7 days   Lab Units 04/26/22 1953   LIPASE u/L 87                       Results from last 7 days   Lab Units 04/26/22  1900   CLARITY UA   Cloudy   COLOR UA   Yellow   SPEC GRAV UA   >=1 030   PH UA   5 5   GLUCOSE UA mg/dl Negative   KETONES UA mg/dl Trace*   BLOOD UA   Small*   PROTEIN UA mg/dl Negative   NITRITE UA   Negative   BILIRUBIN UA   Interference- unable to analyze*   UROBILINOGEN UA E U /dl 0 2   LEUKOCYTES UA   Negative   WBC UA /hpf 4-10*   RBC UA /hpf 1-2*   BACTERIA UA /hpf Moderate*   EPITHELIAL CELLS WET PREP /hpf Moderate*   MUCUS THREADS   Moderate*                   ED Treatment:              Medication Administration from 04/26/2022 1836 to 04/26/2022 2121        Date/Time Order Dose Route Action Comments       04/26/2022 1948 sodium chloride 0 9 % bolus 1,000 mL 1,000 mL Intravenous New Bag         04/26/2022 1953 ketorolac (TORADOL) injection 15 mg 15 mg Intravenous Given         04/26/2022 1953 ondansetron (ZOFRAN) injection 4 mg 4 mg Intravenous Given         04/26/2022 1953 famotidine (PEPCID) injection 20 mg 20 mg Intravenous Given         04/26/2022 1953 dicyclomine (BENTYL) tablet 20 mg 20 mg Oral Given         04/26/2022 2036 morphine (PF) 4 mg/mL injection 4 mg 4 mg Intravenous Given               Admitting Diagnosis: Cholelithiasis [K80 20]  Hyperbilirubinemia [E80 6]  Cholecystitis [K81 9]  Abdominal pain [R10 9]  Transaminitis [R74 01]  Age/Sex: 27 y o  female  Admission Orders:  Scheduled Medications:  piperacillin-tazobactam, 3 375 g, Intravenous, Q6H        Continuous IV Infusions:  sodium chloride, 125 mL/hr, Intravenous, Continuous        PRN Meds:  acetaminophen, 650 mg, Oral, Q6H PRN  HYDROmorphone, 0 5 mg, Intravenous, Q4H PRN  ondansetron, 4 mg, Intravenous, Q4H PRN   ( x2  4/27 thus far)  oxyCODONE, 10 mg, Oral, Q4H PRN  oxyCODONE, 5 mg, Oral, Q4H PRN           IP CONSULT TO ACUTE CARE SURGERY  IP CONSULT TO GASTROENTEROLOGY  IP CONSULT TO CASE MANAGEMENT     Network Utilization Review Department  ATTENTION: Please call with any questions or concerns to [phone] and carefully listen to the prompts so that you are directed to the right person  All voicemails are confidential   Jennifer Yanes all requests for admission clinical reviews, approved or denied determinations and any other requests to dedicated fax number below belonging to the campus where the patient is receiving treatment   List of dedicated fax numbers for the Facilities:  [address] DENIALS (Administrative/Medical Necessity) [phone]   [address] (Maternity/NICU/Pediatrics) [phone] 5000 [facility] 40 125 [facility]  [phone]   [facility] 50 [address] [facility] [place] [phone] [place] [address] [address] [facility] [phone]

## 2022-04-27 NOTE — QUICK NOTE
Addendum: MRCP was just read and it does show "Choledocholithiasis and cholelithiasis with mild-to-moderate intrahepatic biliary ductal dilatation " I discussed findings with pt, who is agreeable to ERCP tomorrow  Pt is asking to trial clear liquids today; NPO midnight  [Right] : right hand dominant [FreeTextEntry1] : She comes in today for evaluation of right hand weakness, and numbness and tingling for the past month.  She denies an injury.\par \par - She was accompanied by her son-in-law, who helped in translation.\par \par - She has type 2 diabetes.

## 2022-04-27 NOTE — ASSESSMENT & PLAN NOTE
Likely related to acute cholecystitis, possible passed stone  Trend LFTs  Follow-up GI recs, General surgery recs

## 2022-04-27 NOTE — H&P
[address]  H&P- Lisette Perez 1995, 27 y o  female MRN: [id number]  Unit/Bed#: E2 -01 Encounter: [id number]  Primary Care Provider: No primary care provider on file  Date and time admitted to hospital: 4/26/2022  6:47 PM    * Abdominal pain  Assessment & Plan  Patient presented with acute right upper quadrant abdominal pain, nausea vomiting diarrhea  Ultrasound with cholelithiasis with positive sonographic Arriaza sign, mild intrahepatic biliary ductal dilation, CBD measures 7 mm, no choledocholithiasis  Elevated AST/ALT/alk-phos, T bili 6 7, lipase 87  Case discussed with General surgery in emergency department-recommend GI evaluation for MRCP/ERCP  Continue NPO  Continue Zosyn  IV fluids, pain control  Trend LFTs    Transaminitis  Assessment & Plan  Likely related to acute cholecystitis, possible passed stone  Trend LFTs  Follow-up GI recs, General surgery recs      VTE Prophylaxis:  On hold preoperatively  Code Status:  Level 1 full code    Anticipated Length of Stay:  Patient will be admitted on an Inpatient basis with an anticipated length of stay of greater than 2 midnights  Justification for Hospital Stay:  Need for inpatient procedure, IV medications    Total Time for Visit, including Counseling / Coordination of Care: 60 minutes  Greater than 50% of this total time spent on direct patient counseling and coordination of care  Chief Complaint:  Abdominal pain      History of Present Illness:    Lisette Perez is a 27 y o  female without significant past acute onset abdominal pain  She reports feeling unwell over the weekend and thought that she came down with a stomach bug  Symptoms progressed and she developed right upper quadrant pain nausea vomiting and diarrhea  Denies fevers, chills, shortness of breath  She does report sick contacts at home as several family members have similar symptoms    She does report cramping abdominal pain over the years that would occur intermittently with meals  She is a nonsmoker, no alcohol use no drug use  She wishes to be level 1 full code  Review of Systems:    Review of Systems   Constitutional: Negative for chills and fever  HENT: Negative for sore throat and trouble swallowing  Eyes: Negative for photophobia and visual disturbance  Respiratory: Negative for shortness of breath and wheezing  Cardiovascular: Negative for chest pain and palpitations  Gastrointestinal: Positive for abdominal pain  Negative for constipation, diarrhea, nausea and vomiting  Genitourinary: Negative for difficulty urinating and dysuria  Musculoskeletal: Negative for arthralgias and myalgias  Skin: Negative for rash and wound  Neurological: Negative for dizziness, light-headedness and headaches  Past Medical and Surgical History:     History reviewed  No pertinent past medical history  History reviewed  No pertinent surgical history  Meds/Allergies:    Prior to Admission medications    Not on File       Allergies: No Known Allergies    Social History:     Marital Status: /Civil Union   Substance Use History:   Social History     Substance and Sexual Activity   Alcohol Use Yes    Comment: socially     Social History     Tobacco Use   Smoking Status Never Smoker   Smokeless Tobacco Never Used     Social History     Substance and Sexual Activity   Drug Use Not on file       Family History:    Pertinent family history reviewed    Physical Exam:     Vitals:   Blood Pressure: 113/54 (04/26/22 2123)  Pulse: 67 (04/26/22 2123)  Temperature: 98 3 °F (36 8 °C) (04/26/22 1846)  Temp Source: Oral (04/26/22 1846)  Respirations: 18 (04/26/22 2123)  Height: 5' 7" (170 2 cm) (04/26/22 1843)  Weight - Scale: 101 kg (221 lb 12 5 oz) (04/26/22 1843)  SpO2: 99 % (04/26/22 2123)    Physical Exam  Vitals reviewed  Constitutional:       General: She is not in acute distress  Appearance: She is well-developed   She is not ill-appearing, toxic-appearing or diaphoretic  HENT:      Head: Normocephalic and atraumatic  Mouth/Throat:      Mouth: Mucous membranes are moist       Pharynx: No oropharyngeal exudate  Eyes:      General: Scleral icterus present  Extraocular Movements: Extraocular movements intact  Conjunctiva/sclera: Conjunctivae normal    Cardiovascular:      Rate and Rhythm: Normal rate and regular rhythm  Heart sounds: Normal heart sounds  Pulmonary:      Effort: Pulmonary effort is normal  No respiratory distress  Breath sounds: Normal breath sounds  No wheezing or rales  Abdominal:      General: There is no distension  Palpations: Abdomen is soft  Tenderness: There is abdominal tenderness (Right upper quadrant)  There is no guarding or rebound  Musculoskeletal:         General: No swelling, tenderness or deformity  Skin:     General: Skin is warm and dry  Coloration: Skin is jaundiced  Neurological:      General: No focal deficit present  Mental Status: She is alert  Mental status is at baseline  Psychiatric:         Mood and Affect: Mood normal          Behavior: Behavior normal          Thought Content:  Thought content normal          Judgment: Judgment normal           Additional Data:     Lab Results: I have reviewed pertinent results     Results from last 7 days   Lab Units 04/26/22 1953   WBC Thousand/uL 5 61   HEMOGLOBIN g/dL 14 1   HEMATOCRIT % 42 1   PLATELETS Thousands/uL 248   NEUTROS PCT % 69   LYMPHS PCT % 20   MONOS PCT % 7   EOS PCT % 3     Results from last 7 days   Lab Units 04/26/22 1953   SODIUM mmol/L 137   POTASSIUM mmol/L 3 4*   CHLORIDE mmol/L 102   CO2 mmol/L 26   BUN mg/dL 6   CREATININE mg/dL 0 75   ANION GAP mmol/L 9   CALCIUM mg/dL 8 7   ALBUMIN g/dL 4 0   TOTAL BILIRUBIN mg/dL 6 71*   ALK PHOS U/L 205*   ALT U/L 299*   AST U/L 80*   GLUCOSE RANDOM mg/dL 95                       Imaging: I have reviewed pertinent imaging     US right upper quadrant   ED Interpretation by Kassidy Mccabe DO (04/26 2022)   Cholelithiasis with positive sonographic Arriaza sign and suspected mild intrahepatic biliary ductal dilatation  Findings may represent acute cholecystitis, correlation with nuclear medicine HIDA scan can be considered for further assessment  Correlation with patient's LFTs is advised  Mild increased echogenicity of the liver most consistent with mild hepatic steatosis  Final Result by Braden Cardona MD (04/26 2018)      Cholelithiasis with positive sonographic Arriaza sign and suspected mild intrahepatic biliary ductal dilatation  Findings may represent acute cholecystitis, correlation with nuclear medicine HIDA scan can be considered for further assessment  Correlation with patient's LFTs is advised  Mild increased echogenicity of the liver most consistent with mild hepatic steatosis  Workstation performed: [id number]         MRI inpatient order    (Results Pending)       EKG, Pathology, and Other Studies Reviewed on Admission:   · EKG: Reviewed     Allscripts / Epic Records Reviewed    ** Please Note: This note has been constructed using a voice recognition system   **

## 2022-04-27 NOTE — PLAN OF CARE
Problem: PAIN - ADULT  Goal: Verbalizes/displays adequate comfort level or baseline comfort level  Description: Interventions:  - Encourage patient to monitor pain and request assistance  - Assess pain using appropriate pain scale  - Administer analgesics based on type and severity of pain and evaluate response  - Implement non-pharmacological measures as appropriate and evaluate response  - Consider cultural and social influences on pain and pain management  - Notify physician/advanced practitioner if interventions unsuccessful or patient reports new pain  Outcome: Progressing     Problem: INFECTION - ADULT  Goal: Absence or prevention of progression during hospitalization  Description: INTERVENTIONS:  - Assess and monitor for signs and symptoms of infection  - Monitor lab/diagnostic results  - Monitor all insertion sites, i e  indwelling lines, tubes, and drains  - Monitor endotracheal if appropriate and nasal secretions for changes in amount and color  - [place] appropriate cooling/warming therapies per order  - Administer medications as ordered  - Instruct and encourage patient and family to use good hand hygiene technique  - Identify and instruct in appropriate isolation precautions for identified infection/condition  Outcome: Progressing  Goal: Absence of fever/infection during neutropenic period  Description: INTERVENTIONS:  - Monitor WBC    Outcome: Progressing     Problem: METABOLIC, FLUID AND ELECTROLYTES - ADULT  Goal: Electrolytes maintained within normal limits  Description: INTERVENTIONS:  - Monitor labs and assess patient for signs and symptoms of electrolyte imbalances  - Administer electrolyte replacement as ordered  - Monitor response to electrolyte replacements, including repeat lab results as appropriate  - Instruct patient on fluid and nutrition as appropriate  Outcome: Progressing  Goal: Fluid balance maintained  Description: INTERVENTIONS:  - Monitor labs   - Monitor I/O and WT  - Instruct patient on fluid and nutrition as appropriate  - Assess for signs & symptoms of volume excess or deficit  Outcome: Progressing  Goal: Glucose maintained within target range  Description: INTERVENTIONS:  - Monitor Blood Glucose as ordered  - Assess for signs and symptoms of hyperglycemia and hypoglycemia  - Administer ordered medications to maintain glucose within target range  - Assess nutritional intake and initiate nutrition service referral as needed  Outcome: Progressing

## 2022-04-27 NOTE — ANESTHESIA PREPROCEDURE EVALUATION
Procedure:  ERCP    Relevant Problems   Other   (+) Obesity (BMI 30-39  9)        Physical Exam    Airway    Mallampati score: II  TM Distance: >3 FB  Neck ROM: full     Dental   No notable dental hx     Cardiovascular  Rhythm: regular, Rate: normal, Cardiovascular exam normal    Pulmonary  Pulmonary exam normal Breath sounds clear to auscultation,     Other Findings        Anesthesia Plan  ASA Score- 2     Anesthesia Type- general with ASA Monitors  Additional Monitors:   Airway Plan: ETT  Plan Factors-    Chart reviewed  Existing labs reviewed  Patient summary reviewed  Induction- intravenous  Postoperative Plan- Plan for postoperative opioid use  Informed Consent- Anesthetic plan and risks discussed with patient

## 2022-04-27 NOTE — CONSULTS
Consultation -  [facility]  Jarred Doan 27 y o  female MRN: [id number]  Unit/Bed#: E2 -01 Encounter: [id number]        Inpatient consult to gastroenterology  Consult performed by: Wilma Doll PA-C  Consult ordered by: Aly Vallejo DO          Reason for Consult / Principal Problem:     1  Cholecystitis  2  Cholelithiasis       ASSESSMENT AND PLAN:       Sheri De La Cruz is a 28 y/o female who presents with RUQ pain and n/v/d      1  Acute calculus cholecystitis  2  Biliary dilation   3  Transaminitis  Pt presents with RUQ pain and n/v/d; pt notes the pain and n/v have been intermittent since Friday but the diarrhea only occurred on Monday ans she has not had any since  Pt notes the RUQ pain worsened yesterday, which prompted ED eval  RUQ u/s noted cholelithiasis with + Arriaza's, suspicious for acute cholecystitis and suspected mild intrahepatic biliary ductal dilation without any CBD stones noted  LFTs elevated at:  AST 94, , , T bili 6 04  No fever or elevated WBC; pt is on [facility]  Surgery team ordered MRCP last night, which is pending    -I called radiology reading room to have MRCP read ASAP; await results as I already discussed with advanced endoscopist in case MRCP results warrant ERCP  -keep NPO at this time   -pain control and antiemetics per SLIM  -monitor LFTs  -monitor VS and abdominal exam  -continue Abx  -appreciate surgical recs           ______________________________________________________________________    HPI:  Sheri De La Cruz is a 28 y/o female who presents with RUQ pain and n/v/d  Pt says that on Friday, she started having RUQ pain that radiated to her back with associated NBNB n/v  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 she assumed this was a stomach bug, especially after her symptoms started to slowly alleviate  Pt notes that she was feeling much better until late Sunday, early Monday when the pain resumed and she had multiple episodes of non-bloody diarrhea  Pt says that she has not had any further diarrhea since Monday, but the RUQ pain worsened yesterday, which prompted ED eval  RUQ u/s in the ED showed gallstones and potential cholecystitis with suspected biliary dilation  Pt's LFTs were also elevated but she does not have fever or white count  Surgery team ordered MRCP in the ED, which is currently pending  Pt notes that she still had pain but it is not severe this morning and she has not had any further n/v today  Pt denies prior abdominal surgical hx and is not on blood thinners  REVIEW OF SYSTEMS:    CONSTITUTIONAL: Denies any fever, chills, rigors, and weight loss  HEENT: No earache or tinnitus  Denies hearing loss or visual disturbances  CARDIOVASCULAR: No chest pain or palpitations  RESPIRATORY: Denies any cough, hemoptysis, shortness of breath or dyspnea on exertion  GASTROINTESTINAL: As noted in the History of Present Illness  GENITOURINARY: No problems with urination  Denies any hematuria or dysuria  NEUROLOGIC: No dizziness or vertigo, denies headaches  MUSCULOSKELETAL: Denies any muscle or joint pain  SKIN: Denies skin rashes or itching  ENDOCRINE: Denies excessive thirst  Denies intolerance to heat or cold  PSYCHOSOCIAL: Denies depression or anxiety  Denies any recent memory loss  Historical Information   History reviewed  No pertinent past medical history  History reviewed  No pertinent surgical history  Social History   Social History     Substance and Sexual Activity   Alcohol Use Yes    Comment: socially     Social History     Substance and Sexual Activity   Drug Use Not on file     Social History     Tobacco Use   Smoking Status Never Smoker   Smokeless Tobacco Never Used     History reviewed  No pertinent family history  Meds/Allergies     No medications prior to admission       Current Facility-Administered Medications   Medication Dose Route Frequency    acetaminophen (TYLENOL) tablet 650 mg  650 mg Oral Q6H PRN  HYDROmorphone (DILAUDID) injection 0 5 mg  0 5 mg Intravenous Q4H PRN    ondansetron (ZOFRAN) injection 4 mg  4 mg Intravenous Q4H PRN    oxyCODONE (ROXICODONE) immediate release tablet 10 mg  10 mg Oral Q4H PRN    oxyCODONE (ROXICODONE) IR tablet 5 mg  5 mg Oral Q4H PRN    piperacillin-tazobactam (ZOSYN) 3 375 g in sodium chloride 0 9 % 100 mL IVPB  3 375 g Intravenous Q6H    sodium chloride 0 9 % infusion  125 mL/hr Intravenous Continuous       No Known Allergies        Objective     Blood pressure 124/59, pulse 63, temperature 97 9 °F (36 6 °C), temperature source Temporal, resp  rate 16, height 5' 4 25" (1 632 m), weight 99 9 kg (220 lb 3 8 oz), SpO2 99 %  Body mass index is 37 51 kg/m²  Intake/Output Summary (Last 24 hours) at 4/27/2022 7123  Last data filed at 4/26/2022 2344  Gross per 24 hour   Intake 240 ml   Output 150 ml   Net 90 ml         PHYSICAL EXAM:      General Appearance:   Alert, cooperative, no distress   HEENT:   Normocephalic, atraumatic, anicteric      Neck:  Supple, symmetrical, trachea midline   Lungs:   Clear to auscultation bilaterally; no rales, rhonchi or wheezing; respirations unlabored    Heart[de-identified]   Regular rate and rhythm; no murmur, rub, or gallop     Abdomen:   Soft, non-tender, non-distended; normal bowel sounds; no masses, no organomegaly    Genitalia:   Deferred    Rectal:   Deferred    Extremities:  No cyanosis, clubbing or edema    Pulses:  2+ and symmetric all extremities    Skin:  No jaundice, rashes, or lesions    Lymph nodes:  No palpable cervical lymphadenopathy        Lab Results:   Admission on 04/26/2022   Component Date Value    EXT PREG TEST UR (Ref: N* 04/26/2022 NEG     Control 04/26/2022 VALID     Color, UA 04/26/2022 Yellow     Clarity, UA 04/26/2022 Cloudy     pH, UA 04/26/2022 5 5     Leukocytes, UA 04/26/2022 Negative     Nitrite, UA 04/26/2022 Negative     Protein, UA 04/26/2022 Negative     Glucose, UA 04/26/2022 Negative     Ketones, UA 04/26/2022 Trace*    Urobilinogen, UA 04/26/2022 0 2     Bilirubin, UA 04/26/2022 Interference- unable to analyze*    Blood, UA 04/26/2022 Small*    Specific Gravity, UA 04/26/2022 >=1 030     RBC, UA 04/26/2022 1-2*    WBC, UA 04/26/2022 4-10*    Epithelial Cells 04/26/2022 Moderate*    Bacteria, UA 04/26/2022 Moderate*    Fine granular casts 04/26/2022 0-1     MUCUS THREADS 04/26/2022 Moderate*    WBC 04/26/2022 5 61     RBC 04/26/2022 4 40     Hemoglobin 04/26/2022 14 1     Hematocrit 04/26/2022 42 1     MCV 04/26/2022 96     MCH 04/26/2022 32 0     MCHC 04/26/2022 33 5     RDW 04/26/2022 12 9     MPV 04/26/2022 10 1     Platelets 94/95/4107 248     nRBC 04/26/2022 0     Neutrophils Relative 04/26/2022 69     Immat GRANS % 04/26/2022 0     Lymphocytes Relative 04/26/2022 20     Monocytes Relative 04/26/2022 7     Eosinophils Relative 04/26/2022 3     Basophils Relative 04/26/2022 1     Neutrophils Absolute 04/26/2022 3 93     Immature Grans Absolute 04/26/2022 0 02     Lymphocytes Absolute 04/26/2022 1 10     Monocytes Absolute 04/26/2022 0 37     Eosinophils Absolute 04/26/2022 0 16     Basophils Absolute 04/26/2022 0 03     Sodium 04/26/2022 137     Potassium 04/26/2022 3 4*    Chloride 04/26/2022 102     CO2 04/26/2022 26     ANION GAP 04/26/2022 9     BUN 04/26/2022 6     Creatinine 04/26/2022 0 75     Glucose 04/26/2022 95     Calcium 04/26/2022 8 7     AST 04/26/2022 80*    ALT 04/26/2022 299*    Alkaline Phosphatase 04/26/2022 205*    Total Protein 04/26/2022 7 9     Albumin 04/26/2022 4 0     Total Bilirubin 04/26/2022 6 71*    eGFR 04/26/2022 108     Lipase 04/26/2022 87     Sodium 04/27/2022 140     Potassium 04/27/2022 3 2*    Chloride 04/27/2022 105     CO2 04/27/2022 21     ANION GAP 04/27/2022 14*    BUN 04/27/2022 7     Creatinine 04/27/2022 0 74     Glucose 04/27/2022 77     Calcium 04/27/2022 8 3     Corrected Calcium 04/27/2022 8 8     AST 04/27/2022 94*    ALT 04/27/2022 271*    Alkaline Phosphatase 04/27/2022 175*    Total Protein 04/27/2022 6 8     Albumin 04/27/2022 3 4*    Total Bilirubin 04/27/2022 6 04*    eGFR 04/27/2022 110     WBC 04/27/2022 4 99     RBC 04/27/2022 4 00     Hemoglobin 04/27/2022 12 2     Hematocrit 04/27/2022 37 7     MCV 04/27/2022 94     MCH 04/27/2022 30 5     MCHC 04/27/2022 32 4     RDW 04/27/2022 13 1     MPV 04/27/2022 10 7     Platelets 89/46/7425 236     nRBC 04/27/2022 0     Neutrophils Relative 04/27/2022 62     Immat GRANS % 04/27/2022 0     Lymphocytes Relative 04/27/2022 27     Monocytes Relative 04/27/2022 8     Eosinophils Relative 04/27/2022 3     Basophils Relative 04/27/2022 0     Neutrophils Absolute 04/27/2022 3 07     Immature Grans Absolute 04/27/2022 0 02     Lymphocytes Absolute 04/27/2022 1 35     Monocytes Absolute 04/27/2022 0 38     Eosinophils Absolute 04/27/2022 0 15     Basophils Absolute 04/27/2022 0 02     Protime 04/27/2022 12 8     INR 04/27/2022 0 98        Imaging Studies: I have personally reviewed pertinent imaging studies

## 2022-04-27 NOTE — CONSULTS
Consultation - General Surgery   Suze Charito Record 27 y o  female MRN: [id number]  Unit/Bed#: E2 -01 Encounter: [id number]    Assessment:  28-yr old F w/ obstructive jaundice 2/2 choledocholithiasis  Hemodynamically stable; afebrile  RUQ tenderness; no peritoneal signs  White count: 5  Total bilirubin: 6 0 (6 7)  ALP/AST/ALT: 175/94/271  RUQ US: cholelithiasis; CBD: 7 mm w/ mild intrahepatic biliary dilatation  No CBD stones  Plan:  - MRCP to further evaluate biliary duct anatomy   - GI consult for potential ERCP  - cholecystectomy following the above; likely in this index admission   - other care per primary team     History of Present Illness   HPI:  Vinny Jones is a 27 y o  female who presents with a 4-day hx of worsening generalized abdominal pain more prominently involving the RUQ  She initially attributed her symptoms to lactose intolerance but was of more severe intensity  Hx of pruritus, passage of dark colored urine and diarrhea  No hx of fever or chills; no hx of weight loss  No hx of prior symptoms  Inpatient consult to Acute Care Surgery  Consult performed by: Gregory Sanz MD  Consult ordered by: Ethel Roque DO          Review of Systems   Constitutional: Positive for activity change  Negative for appetite change, chills, diaphoresis, fatigue, fever and unexpected weight change  HENT: Negative  Eyes: Negative  Respiratory: Negative  Cardiovascular: Negative  Gastrointestinal: Positive for abdominal pain, diarrhea and nausea  Negative for abdominal distention, blood in stool, constipation and vomiting  Endocrine: Negative  Genitourinary: Negative  Musculoskeletal: Negative  Skin: Negative  Neurological: Negative  Hematological: Negative  Psychiatric/Behavioral: Negative  Historical Information   History reviewed  No pertinent past medical history  History reviewed  No pertinent surgical history    Social History Social History     Substance and Sexual Activity   Alcohol Use Yes    Comment: socially     Social History     Substance and Sexual Activity   Drug Use Not on file     E-Cigarette/Vaping     E-Cigarette/Vaping Substances     Social History     Tobacco Use   Smoking Status Never Smoker   Smokeless Tobacco Never Used     Family History: non-contributory    Meds/Allergies   all current active meds have been reviewed  No Known Allergies    Objective   First Vitals:   Blood Pressure: 135/79 (04/26/22 1844)  Pulse: 95 (04/26/22 1844)  Temperature: 98 3 °F (36 8 °C) (04/26/22 1846)  Temp Source: Oral (04/26/22 1846)  Respirations: 16 (04/26/22 1844)  Height: 5' 7" (170 2 cm) (04/26/22 1843)  Weight - Scale: 101 kg (221 lb 12 5 oz) (04/26/22 1843)  SpO2: 100 % (04/26/22 1844)    Current Vitals:   Blood Pressure: 110/53 (04/26/22 2300)  Pulse: 65 (04/26/22 2300)  Temperature: 97 8 °F (36 6 °C) (04/26/22 2300)  Temp Source: Tympanic (04/26/22 2300)  Respirations: 20 (04/26/22 2300)  Height: 5' 4 25" (163 2 cm) (04/26/22 2123)  Weight - Scale: 99 9 kg (220 lb 3 8 oz) (04/26/22 2123)  SpO2: 99 % (04/26/22 2300)      Intake/Output Summary (Last 24 hours) at 4/27/2022 0727  Last data filed at 4/26/2022 2344  Gross per 24 hour   Intake 240 ml   Output 150 ml   Net 90 ml       Invasive Devices  Report    Peripheral Intravenous Line            Peripheral IV 04/26/22 Left Antecubital <1 day                Physical Exam  Vitals reviewed  Constitutional:       General: She is not in acute distress  Appearance: She is not ill-appearing, toxic-appearing or diaphoretic  HENT:      Head: Normocephalic and atraumatic  Right Ear: External ear normal       Left Ear: External ear normal       Nose: Nose normal    Eyes:      Comments: Scleral icterus   Cardiovascular:      Rate and Rhythm: Normal rate  Pulses: Normal pulses  Pulmonary:      Effort: Pulmonary effort is normal  No respiratory distress     Abdominal: General: There is no distension  Palpations: Abdomen is soft  Tenderness: There is no right CVA tenderness, left CVA tenderness, guarding or rebound  Musculoskeletal:         General: Normal range of motion  Cervical back: Normal range of motion  Skin:     General: Skin is warm  Capillary Refill: Capillary refill takes less than 2 seconds  Coloration: Skin is jaundiced  Neurological:      General: No focal deficit present  Mental Status: She is alert and oriented to person, place, and time  Psychiatric:         Mood and Affect: Mood normal          Lab Results:   I have personally reviewed pertinent lab results  , CBC:   Lab Results   Component Value Date    WBC 4 99 04/27/2022    HGB 12 2 04/27/2022    HCT 37 7 04/27/2022    MCV 94 04/27/2022     04/27/2022    MCH 30 5 04/27/2022    MCHC 32 4 04/27/2022    RDW 13 1 04/27/2022    MPV 10 7 04/27/2022    NRBC 0 04/27/2022   , CMP:   Lab Results   Component Value Date    SODIUM 140 04/27/2022    K 3 2 (L) 04/27/2022     04/27/2022    CO2 21 04/27/2022    BUN 7 04/27/2022    CREATININE 0 74 04/27/2022    CALCIUM 8 3 04/27/2022    AST 94 (H) 04/27/2022     (H) 04/27/2022    ALKPHOS 175 (H) 04/27/2022    EGFR 110 04/27/2022   , Coagulation:   Lab Results   Component Value Date    INR 0 98 04/27/2022   , Urinalysis:   Lab Results   Component Value Date    COLORU Yellow 04/26/2022    CLARITYU Cloudy 04/26/2022    SPECGRAV >=1 030 04/26/2022    PHUR 5 5 04/26/2022    LEUKOCYTESUR Negative 04/26/2022    NITRITE Negative 04/26/2022    GLUCOSEU Negative 04/26/2022    KETONESU Trace (A) 04/26/2022    BILIRUBINUR Interference- unable to analyze (A) 04/26/2022    BLOODU Small (A) 04/26/2022   , Amylase: No results found for: AMYLASE  Imaging: I have personally reviewed pertinent reports  EKG, Pathology, and Other Studies: I have personally reviewed pertinent reports        Counseling / Coordination of Care  Total floor / unit time spent today 30 minutes  Greater than 50% of total time was spent with the patient and / or family counseling and / or coordination of care    A description of the counseling / coordination of care: NA

## 2022-04-28 ENCOUNTER — ANESTHESIA EVENT (INPATIENT)
Dept: PERIOP | Facility: HOSPITAL | Age: 27
DRG: 263 | End: 2022-04-28
Payer: COMMERCIAL

## 2022-04-28 ENCOUNTER — APPOINTMENT (INPATIENT)
Dept: PERIOP | Facility: HOSPITAL | Age: 27
DRG: 263 | End: 2022-04-28
Payer: COMMERCIAL

## 2022-04-28 ENCOUNTER — ANESTHESIA (INPATIENT)
Dept: GASTROENTEROLOGY | Facility: HOSPITAL | Age: 27
DRG: 263 | End: 2022-04-28
Payer: COMMERCIAL

## 2022-04-28 ENCOUNTER — ANESTHESIA (INPATIENT)
Dept: PERIOP | Facility: HOSPITAL | Age: 27
DRG: 263 | End: 2022-04-28
Payer: COMMERCIAL

## 2022-04-28 ENCOUNTER — APPOINTMENT (INPATIENT)
Dept: RADIOLOGY | Facility: HOSPITAL | Age: 27
DRG: 263 | End: 2022-04-28
Payer: COMMERCIAL

## 2022-04-28 LAB
ABO GROUP BLD: NORMAL
ABO GROUP BLD: NORMAL
ALBUMIN SERPL BCP-MCNC: 3 G/DL (ref 3.5–5)
ALP SERPL-CCNC: 159 U/L (ref 46–116)
ALT SERPL W P-5'-P-CCNC: 422 U/L (ref 12–78)
ANION GAP SERPL CALCULATED.3IONS-SCNC: 13 MMOL/L (ref 4–13)
AST SERPL W P-5'-P-CCNC: 194 U/L (ref 5–45)
BASOPHILS # BLD AUTO: 0.02 THOUSANDS/ΜL (ref 0–0.1)
BASOPHILS NFR BLD AUTO: 0 % (ref 0–1)
BILIRUB SERPL-MCNC: 6 MG/DL (ref 0.2–1)
BLD GP AB SCN SERPL QL: NEGATIVE
BUN SERPL-MCNC: 7 MG/DL (ref 5–25)
CALCIUM ALBUM COR SERPL-MCNC: 8.6 MG/DL (ref 8.3–10.1)
CALCIUM SERPL-MCNC: 7.8 MG/DL (ref 8.3–10.1)
CHLORIDE SERPL-SCNC: 106 MMOL/L (ref 100–108)
CO2 SERPL-SCNC: 22 MMOL/L (ref 21–32)
CREAT SERPL-MCNC: 0.85 MG/DL (ref 0.6–1.3)
EOSINOPHIL # BLD AUTO: 0.14 THOUSAND/ΜL (ref 0–0.61)
EOSINOPHIL NFR BLD AUTO: 3 % (ref 0–6)
ERYTHROCYTE [DISTWIDTH] IN BLOOD BY AUTOMATED COUNT: 13.3 % (ref 11.6–15.1)
GFR SERPL CREATININE-BSD FRML MDRD: 93 ML/MIN/1.73SQ M
GLUCOSE SERPL-MCNC: 67 MG/DL (ref 65–140)
HAV IGM SER QL: NORMAL
HBV CORE IGM SER QL: NORMAL
HBV SURFACE AG SER QL: NORMAL
HCT VFR BLD AUTO: 34.6 % (ref 34.8–46.1)
HCV AB SER QL: NORMAL
HGB BLD-MCNC: 11 G/DL (ref 11.5–15.4)
IMM GRANULOCYTES # BLD AUTO: 0.02 THOUSAND/UL (ref 0–0.2)
IMM GRANULOCYTES NFR BLD AUTO: 0 % (ref 0–2)
LYMPHOCYTES # BLD AUTO: 1.03 THOUSANDS/ΜL (ref 0.6–4.47)
LYMPHOCYTES NFR BLD AUTO: 20 % (ref 14–44)
MCH RBC QN AUTO: 30.7 PG (ref 26.8–34.3)
MCHC RBC AUTO-ENTMCNC: 31.8 G/DL (ref 31.4–37.4)
MCV RBC AUTO: 97 FL (ref 82–98)
MONOCYTES # BLD AUTO: 0.37 THOUSAND/ΜL (ref 0.17–1.22)
MONOCYTES NFR BLD AUTO: 7 % (ref 4–12)
NEUTROPHILS # BLD AUTO: 3.6 THOUSANDS/ΜL (ref 1.85–7.62)
NEUTS SEG NFR BLD AUTO: 70 % (ref 43–75)
NRBC BLD AUTO-RTO: 0 /100 WBCS
PLATELET # BLD AUTO: 192 THOUSANDS/UL (ref 149–390)
PMV BLD AUTO: 11.1 FL (ref 8.9–12.7)
POTASSIUM SERPL-SCNC: 3.3 MMOL/L (ref 3.5–5.3)
PROT SERPL-MCNC: 6 G/DL (ref 6.4–8.2)
RBC # BLD AUTO: 3.58 MILLION/UL (ref 3.81–5.12)
RH BLD: POSITIVE
RH BLD: POSITIVE
SODIUM SERPL-SCNC: 141 MMOL/L (ref 136–145)
SPECIMEN EXPIRATION DATE: NORMAL
WBC # BLD AUTO: 5.18 THOUSAND/UL (ref 4.31–10.16)

## 2022-04-28 PROCEDURE — 86850 RBC ANTIBODY SCREEN: CPT | Performed by: STUDENT IN AN ORGANIZED HEALTH CARE EDUCATION/TRAINING PROGRAM

## 2022-04-28 PROCEDURE — 99233 SBSQ HOSP IP/OBS HIGH 50: CPT | Performed by: SURGERY

## 2022-04-28 PROCEDURE — 86901 BLOOD TYPING SEROLOGIC RH(D): CPT | Performed by: STUDENT IN AN ORGANIZED HEALTH CARE EDUCATION/TRAINING PROGRAM

## 2022-04-28 PROCEDURE — C1769 GUIDE WIRE: HCPCS | Performed by: SURGERY

## 2022-04-28 PROCEDURE — 88304 TISSUE EXAM BY PATHOLOGIST: CPT | Performed by: PATHOLOGY

## 2022-04-28 PROCEDURE — 99232 SBSQ HOSP IP/OBS MODERATE 35: CPT | Performed by: INTERNAL MEDICINE

## 2022-04-28 PROCEDURE — 85025 COMPLETE CBC W/AUTO DIFF WBC: CPT | Performed by: STUDENT IN AN ORGANIZED HEALTH CARE EDUCATION/TRAINING PROGRAM

## 2022-04-28 PROCEDURE — 0FT44ZZ RESECTION OF GALLBLADDER, PERCUTANEOUS ENDOSCOPIC APPROACH: ICD-10-PCS | Performed by: SURGERY

## 2022-04-28 PROCEDURE — 74330 X-RAY BILE/PANC ENDOSCOPY: CPT

## 2022-04-28 PROCEDURE — 80053 COMPREHEN METABOLIC PANEL: CPT | Performed by: STUDENT IN AN ORGANIZED HEALTH CARE EDUCATION/TRAINING PROGRAM

## 2022-04-28 PROCEDURE — 86900 BLOOD TYPING SEROLOGIC ABO: CPT | Performed by: STUDENT IN AN ORGANIZED HEALTH CARE EDUCATION/TRAINING PROGRAM

## 2022-04-28 PROCEDURE — 47562 LAPAROSCOPIC CHOLECYSTECTOMY: CPT | Performed by: SURGERY

## 2022-04-28 PROCEDURE — 0FC98ZZ EXTIRPATION OF MATTER FROM COMMON BILE DUCT, VIA NATURAL OR ARTIFICIAL OPENING ENDOSCOPIC: ICD-10-PCS | Performed by: RADIOLOGY

## 2022-04-28 RX ORDER — OXYCODONE HYDROCHLORIDE 5 MG/1
5 TABLET ORAL EVERY 6 HOURS PRN
Qty: 20 TABLET | Refills: 0 | Status: SHIPPED | OUTPATIENT
Start: 2022-04-28 | End: 2022-05-03

## 2022-04-28 RX ORDER — ONDANSETRON 2 MG/ML
4 INJECTION INTRAMUSCULAR; INTRAVENOUS ONCE AS NEEDED
Status: DISCONTINUED | OUTPATIENT
Start: 2022-04-28 | End: 2022-04-28 | Stop reason: HOSPADM

## 2022-04-28 RX ORDER — MIDAZOLAM HYDROCHLORIDE 2 MG/2ML
INJECTION, SOLUTION INTRAMUSCULAR; INTRAVENOUS AS NEEDED
Status: DISCONTINUED | OUTPATIENT
Start: 2022-04-28 | End: 2022-04-28

## 2022-04-28 RX ORDER — FENTANYL CITRATE 50 UG/ML
INJECTION, SOLUTION INTRAMUSCULAR; INTRAVENOUS AS NEEDED
Status: DISCONTINUED | OUTPATIENT
Start: 2022-04-28 | End: 2022-04-28

## 2022-04-28 RX ORDER — ROCURONIUM BROMIDE 10 MG/ML
INJECTION, SOLUTION INTRAVENOUS AS NEEDED
Status: DISCONTINUED | OUTPATIENT
Start: 2022-04-28 | End: 2022-04-28

## 2022-04-28 RX ORDER — PROMETHAZINE HYDROCHLORIDE 25 MG/ML
12.5 INJECTION, SOLUTION INTRAMUSCULAR; INTRAVENOUS ONCE AS NEEDED
Status: COMPLETED | OUTPATIENT
Start: 2022-04-28 | End: 2022-04-28

## 2022-04-28 RX ORDER — GLYCOPYRROLATE 0.2 MG/ML
INJECTION INTRAMUSCULAR; INTRAVENOUS AS NEEDED
Status: DISCONTINUED | OUTPATIENT
Start: 2022-04-28 | End: 2022-04-28

## 2022-04-28 RX ORDER — NEOSTIGMINE METHYLSULFATE 1 MG/ML
INJECTION INTRAVENOUS AS NEEDED
Status: DISCONTINUED | OUTPATIENT
Start: 2022-04-28 | End: 2022-04-28

## 2022-04-28 RX ORDER — BUPIVACAINE HYDROCHLORIDE 5 MG/ML
INJECTION, SOLUTION PERINEURAL AS NEEDED
Status: DISCONTINUED | OUTPATIENT
Start: 2022-04-28 | End: 2022-04-28 | Stop reason: HOSPADM

## 2022-04-28 RX ORDER — ONDANSETRON 2 MG/ML
INJECTION INTRAMUSCULAR; INTRAVENOUS AS NEEDED
Status: DISCONTINUED | OUTPATIENT
Start: 2022-04-28 | End: 2022-04-28

## 2022-04-28 RX ORDER — DEXAMETHASONE SODIUM PHOSPHATE 10 MG/ML
INJECTION, SOLUTION INTRAMUSCULAR; INTRAVENOUS AS NEEDED
Status: DISCONTINUED | OUTPATIENT
Start: 2022-04-28 | End: 2022-04-28

## 2022-04-28 RX ORDER — MAGNESIUM HYDROXIDE 1200 MG/15ML
LIQUID ORAL AS NEEDED
Status: DISCONTINUED | OUTPATIENT
Start: 2022-04-28 | End: 2022-04-28 | Stop reason: HOSPADM

## 2022-04-28 RX ORDER — EPHEDRINE SULFATE 50 MG/ML
INJECTION INTRAVENOUS AS NEEDED
Status: DISCONTINUED | OUTPATIENT
Start: 2022-04-28 | End: 2022-04-28

## 2022-04-28 RX ORDER — PROPOFOL 10 MG/ML
INJECTION, EMULSION INTRAVENOUS AS NEEDED
Status: DISCONTINUED | OUTPATIENT
Start: 2022-04-28 | End: 2022-04-28

## 2022-04-28 RX ORDER — KETOROLAC TROMETHAMINE 30 MG/ML
INJECTION, SOLUTION INTRAMUSCULAR; INTRAVENOUS AS NEEDED
Status: DISCONTINUED | OUTPATIENT
Start: 2022-04-28 | End: 2022-04-28

## 2022-04-28 RX ORDER — FENTANYL CITRATE/PF 50 MCG/ML
50 SYRINGE (ML) INJECTION
Status: DISCONTINUED | OUTPATIENT
Start: 2022-04-28 | End: 2022-04-28 | Stop reason: HOSPADM

## 2022-04-28 RX ORDER — HYDROMORPHONE HCL/PF 1 MG/ML
SYRINGE (ML) INJECTION AS NEEDED
Status: DISCONTINUED | OUTPATIENT
Start: 2022-04-28 | End: 2022-04-28

## 2022-04-28 RX ADMIN — SODIUM CHLORIDE 125 ML/HR: 0.9 INJECTION, SOLUTION INTRAVENOUS at 13:36

## 2022-04-28 RX ADMIN — PIPERACILLIN SODIUM AND TAZOBACTAM SODIUM 3.38 G: 36; 4.5 INJECTION, POWDER, LYOPHILIZED, FOR SOLUTION INTRAVENOUS at 14:54

## 2022-04-28 RX ADMIN — ONDANSETRON 4 MG: 2 INJECTION INTRAMUSCULAR; INTRAVENOUS at 04:42

## 2022-04-28 RX ADMIN — FENTANYL CITRATE 50 MCG: 50 INJECTION, SOLUTION INTRAMUSCULAR; INTRAVENOUS at 18:10

## 2022-04-28 RX ADMIN — HYDROMORPHONE HYDROCHLORIDE 0.5 MG: 1 INJECTION, SOLUTION INTRAMUSCULAR; INTRAVENOUS; SUBCUTANEOUS at 16:17

## 2022-04-28 RX ADMIN — GLYCOPYRROLATE 0.1 MG: 0.2 INJECTION, SOLUTION INTRAMUSCULAR; INTRAVENOUS at 16:36

## 2022-04-28 RX ADMIN — ROCURONIUM BROMIDE 50 MG: 50 INJECTION, SOLUTION INTRAVENOUS at 15:06

## 2022-04-28 RX ADMIN — INDOMETHACIN 100 MG: 50 SUPPOSITORY RECTAL at 15:21

## 2022-04-28 RX ADMIN — EPHEDRINE SULFATE 5 MG: 50 INJECTION, SOLUTION INTRAVENOUS at 16:06

## 2022-04-28 RX ADMIN — GLYCOPYRROLATE 0.8 MG: 0.2 INJECTION, SOLUTION INTRAMUSCULAR; INTRAVENOUS at 17:28

## 2022-04-28 RX ADMIN — IOHEXOL 50 ML: 240 INJECTION, SOLUTION INTRATHECAL; INTRAVASCULAR; INTRAVENOUS; ORAL at 15:25

## 2022-04-28 RX ADMIN — DEXAMETHASONE SODIUM PHOSPHATE 5 MG: 10 INJECTION INTRAMUSCULAR; INTRAVENOUS at 15:24

## 2022-04-28 RX ADMIN — PIPERACILLIN SODIUM AND TAZOBACTAM SODIUM 3.38 G: 36; 4.5 INJECTION, POWDER, LYOPHILIZED, FOR SOLUTION INTRAVENOUS at 04:42

## 2022-04-28 RX ADMIN — FENTANYL CITRATE 200 MCG: 50 INJECTION INTRAMUSCULAR; INTRAVENOUS at 15:06

## 2022-04-28 RX ADMIN — OXYCODONE HYDROCHLORIDE 10 MG: 10 TABLET ORAL at 00:14

## 2022-04-28 RX ADMIN — MIDAZOLAM 2 MG: 1 INJECTION INTRAMUSCULAR; INTRAVENOUS at 15:05

## 2022-04-28 RX ADMIN — OXYCODONE HYDROCHLORIDE 10 MG: 10 TABLET ORAL at 05:45

## 2022-04-28 RX ADMIN — NEOSTIGMINE METHYLSULFATE 4 MG: 1 INJECTION INTRAVENOUS at 17:28

## 2022-04-28 RX ADMIN — KETOROLAC TROMETHAMINE 30 MG: 30 INJECTION, SOLUTION INTRAMUSCULAR at 17:25

## 2022-04-28 RX ADMIN — ONDANSETRON 4 MG: 2 INJECTION INTRAMUSCULAR; INTRAVENOUS at 13:37

## 2022-04-28 RX ADMIN — ONDANSETRON 4 MG: 2 INJECTION INTRAMUSCULAR; INTRAVENOUS at 09:12

## 2022-04-28 RX ADMIN — OXYCODONE HYDROCHLORIDE 10 MG: 10 TABLET ORAL at 20:47

## 2022-04-28 RX ADMIN — PIPERACILLIN SODIUM AND TAZOBACTAM SODIUM 3.38 G: 36; 4.5 INJECTION, POWDER, LYOPHILIZED, FOR SOLUTION INTRAVENOUS at 09:09

## 2022-04-28 RX ADMIN — PROPOFOL 200 MG: 10 INJECTION, EMULSION INTRAVENOUS at 15:06

## 2022-04-28 RX ADMIN — ONDANSETRON 4 MG: 2 INJECTION INTRAMUSCULAR; INTRAVENOUS at 15:24

## 2022-04-28 RX ADMIN — ROCURONIUM BROMIDE 10 MG: 50 INJECTION, SOLUTION INTRAVENOUS at 16:10

## 2022-04-28 RX ADMIN — SODIUM CHLORIDE: 0.9 INJECTION, SOLUTION INTRAVENOUS at 15:55

## 2022-04-28 RX ADMIN — SODIUM CHLORIDE 125 ML/HR: 0.9 INJECTION, SOLUTION INTRAVENOUS at 18:31

## 2022-04-28 RX ADMIN — PROMETHAZINE HYDROCHLORIDE 12.5 MG: 25 INJECTION INTRAMUSCULAR; INTRAVENOUS at 18:18

## 2022-04-28 RX ADMIN — OXYCODONE HYDROCHLORIDE 10 MG: 10 TABLET ORAL at 11:40

## 2022-04-28 NOTE — ANESTHESIA PREPROCEDURE EVALUATION
Procedure:  CHOLECYSTECTOMY LAPAROSCOPIC (N/A Abdomen)    Relevant Problems   ANESTHESIA (within normal limits)      Other   (+) Abdominal pain   (+) Obesity (BMI 30-39  9)        Physical Exam    Airway    Mallampati score: II  TM Distance: >3 FB  Neck ROM: full     Dental   No notable dental hx     Cardiovascular  Rhythm: regular, Rate: normal, Cardiovascular exam normal    Pulmonary  Pulmonary exam normal Breath sounds clear to auscultation,     Other Findings        Anesthesia Plan  ASA Score- 2     Anesthesia Type- general with ASA Monitors  Additional Monitors:   Airway Plan: ETT  Plan Factors-Exercise tolerance (METS): >4 METS  Chart reviewed  Existing labs reviewed  Patient summary reviewed  Patient is not a current smoker  Induction- intravenous  Postoperative Plan-     Informed Consent- Anesthetic plan and risks discussed with patient

## 2022-04-28 NOTE — ASSESSMENT & PLAN NOTE
· Likely related to acute cholecystitis, possible passed stone  · Trend LFTs  · Plan for ERCP today and then possible cholecystectomy after procedure

## 2022-04-28 NOTE — PLAN OF CARE
Problem: PAIN - ADULT  Goal: Verbalizes/displays adequate comfort level or baseline comfort level  Description: Interventions:  - Encourage patient to monitor pain and request assistance  - Assess pain using appropriate pain scale  - Administer analgesics based on type and severity of pain and evaluate response  - Implement non-pharmacological measures as appropriate and evaluate response  - Consider cultural and social influences on pain and pain management  - Notify physician/advanced practitioner if interventions unsuccessful or patient reports new pain  Outcome: Progressing     Problem: INFECTION - ADULT  Goal: Absence or prevention of progression during hospitalization  Description: INTERVENTIONS:  - Assess and monitor for signs and symptoms of infection  - Monitor lab/diagnostic results  - Monitor all insertion sites, i e  indwelling lines, tubes, and drains  - Monitor endotracheal if appropriate and nasal secretions for changes in amount and color  - [place] appropriate cooling/warming therapies per order  - Administer medications as ordered  - Instruct and encourage patient and family to use good hand hygiene technique  - Identify and instruct in appropriate isolation precautions for identified infection/condition  Outcome: Progressing  Goal: Absence of fever/infection during neutropenic period  Description: INTERVENTIONS:  - Monitor WBC    Outcome: Progressing     Problem: SAFETY ADULT  Goal: Patient will remain free of falls  Description: INTERVENTIONS:  - Educate patient/family on patient safety including physical limitations  - Instruct patient to call for assistance with activity   - Consult OT/PT to assist with strengthening/mobility   - Keep Call bell within reach  - Keep bed low and locked with side rails adjusted as appropriate  - Keep care items and personal belongings within reach  - Initiate and maintain comfort rounds  - Make Fall Risk Sign visible to staff  - Offer Toileting every 2 Hours, in advance of need  - Apply yellow socks and bracelet for high fall risk patients  - Consider moving patient to room near nurses station  Outcome: Progressing  Goal: Maintain or return to baseline ADL function  Description: INTERVENTIONS:  -  Assess patient's ability to carry out ADLs; assess patient's baseline for ADL function and identify physical deficits which impact ability to perform ADLs (bathing, care of mouth/teeth, toileting, grooming, dressing, etc )  - Assess/evaluate cause of self-care deficits   - Assess range of motion  - Assess patient's mobility; develop plan if impaired  - Assess patient's need for assistive devices and provide as appropriate  - Encourage maximum independence but intervene and supervise when necessary  - Involve family in performance of ADLs  - Assess for home care needs following discharge   - Consider OT consult to assist with ADL evaluation and planning for discharge  - Provide patient education as appropriate  Outcome: Progressing  Goal: Maintains/Returns to pre admission functional level  Description: INTERVENTIONS:  - Perform BMAT or MOVE assessment daily    - Set and communicate daily mobility goal to care team and patient/family/caregiver  - Collaborate with rehabilitation services on mobility goals if consulted  - Perform Range of Motion 2 times a day  - Reposition patient every 2 hours    - Dangle patient 3 times a day  - Stand patient 3 times a day  - Ambulate patient 3 times a day  - Out of bed to chair 3 times a day   - Out of bed for toileting  - Record patient progress and toleration of activity level   Outcome: Progressing     Problem: DISCHARGE PLANNING  Goal: Discharge to home or other facility with appropriate resources  Description: INTERVENTIONS:  - Identify barriers to discharge w/patient and caregiver  - Arrange for needed discharge resources and transportation as appropriate  - Identify discharge learning needs (meds, wound care, etc )  - Arrange for interpretive services to assist at discharge as needed  - Refer to Case Management Department for coordinating discharge planning if the patient needs post-hospital services based on physician/advanced practitioner order or complex needs related to functional status, cognitive ability, or social support system  Outcome: Progressing     Problem: Knowledge Deficit  Goal: Patient/family/caregiver demonstrates understanding of disease process, treatment plan, medications, and discharge instructions  Description: Complete learning assessment and assess knowledge base    Interventions:  - Provide teaching at level of understanding  - Provide teaching via preferred learning methods  Outcome: Progressing     Problem: METABOLIC, FLUID AND ELECTROLYTES - ADULT  Goal: Electrolytes maintained within normal limits  Description: INTERVENTIONS:  - Monitor labs and assess patient for signs and symptoms of electrolyte imbalances  - Administer electrolyte replacement as ordered  - Monitor response to electrolyte replacements, including repeat lab results as appropriate  - Instruct patient on fluid and nutrition as appropriate  Outcome: Progressing  Goal: Fluid balance maintained  Description: INTERVENTIONS:  - Monitor labs   - Monitor I/O and WT  - Instruct patient on fluid and nutrition as appropriate  - Assess for signs & symptoms of volume excess or deficit  Outcome: Progressing  Goal: Glucose maintained within target range  Description: INTERVENTIONS:  - Monitor Blood Glucose as ordered  - Assess for signs and symptoms of hyperglycemia and hypoglycemia  - Administer ordered medications to maintain glucose within target range  - Assess nutritional intake and initiate nutrition service referral as needed  Outcome: Progressing

## 2022-04-28 NOTE — ASSESSMENT & PLAN NOTE
· Patient presented with acute right upper quadrant abdominal pain, nausea, vomiting, diarrhea  · Ultrasound with cholelithiasis with positive sonographic Arriaza sign, mild intrahepatic biliary ductal dilation, CBD measures 7 mm, no choledocholithiasis  · Elevated AST/ALT/alk-phos  · GI consult appreciated   · MRCP- Choledocholithiasis and cholelithiasis with mild-to-moderate intrahepatic biliary ductal dilatation     · Plan for ERCP today and possible cholecystectomy after procedure   · Continue NPO  · Continue Zosyn  · IV fluids, pain control  · Trend LFTs

## 2022-04-28 NOTE — PROGRESS NOTES
Progress Note - General Surgery   Suze Bowen 27 y o  female MRN: [id number]  Unit/Bed#: E2 -01 Encounter: [id number]    Assessment:  27 y o  F who presented with jaundice, hyperbilirubinemia and abdominal pain, with workup consistent with acute cholelithiasis and choledocholithiasis     Afebrile  VSS    T bili 6 00 from 6 04 this morning       Plan:  ERCP today with GI  Will attempt coordination for laparoscopic cholecystectomy to follow the ERCP  NPO  [email or web address]  Can consider discontinuing abx given no signs of acute cholecystitis on MRCP   Prn analgesia/anti-emetics  DVT ppx   Remainder of care per primary team    Subjective/Objective     Subjective: No acute events overnight  Pain is controlled on prn analgesia  Nausea has improved  No fevers, chills  Objective:     Blood pressure 105/58, pulse 61, temperature 97 6 °F (36 4 °C), temperature source Tympanic, resp  rate 20, height 5' 4 25" (1 632 m), weight 99 9 kg (220 lb 3 8 oz), SpO2 97 %  ,Body mass index is 37 51 kg/m²  Intake/Output Summary (Last 24 hours) at 4/28/2022 0714  Last data filed at 4/28/2022 0442  Gross per 24 hour   Intake 240 ml   Output --   Net 240 ml       Invasive Devices  Report    Peripheral Intravenous Line            Peripheral IV 04/27/22 Right Antecubital <1 day                Physical Exam:   NAD, alert and oriented x3  Normocephalic, atraumatic  MMM  Norm resp effort on room air   Regular rate  Abd soft, nondistended, RUQ/Epigastric tenderness     No calf tenderness or peripheral edema  Motor/sensation intact in distal extremities  CN grossly intact  -rash/lesions      Lab, Imaging and other studies:  CBC:   Lab Results   Component Value Date    WBC 5 18 04/28/2022    HGB 11 0 (L) 04/28/2022    HCT 34 6 (L) 04/28/2022    MCV 97 04/28/2022     04/28/2022    MCH 30 7 04/28/2022    MCHC 31 8 04/28/2022    RDW 13 3 04/28/2022    MPV 11 1 04/28/2022    NRBC 0 04/28/2022   , CMP:   Lab Results   Component Value Date    SODIUM 141 04/28/2022    K 3 3 (L) 04/28/2022     04/28/2022    CO2 22 04/28/2022    BUN 7 04/28/2022    CREATININE 0 85 04/28/2022    CALCIUM 7 8 (L) 04/28/2022     (H) 04/28/2022     (H) 04/28/2022    ALKPHOS 159 (H) 04/28/2022    EGFR 93 04/28/2022     VTE Pharmacologic Prophylaxis: Sequential compression device (Venodyne)   VTE Mechanical Prophylaxis: sequential compression device

## 2022-04-28 NOTE — ANESTHESIA POSTPROCEDURE EVALUATION
Post-Op Assessment Note    CV Status:  Stable  Pain Score: 2    Pain management: adequate     Mental Status:  Alert and awake   Hydration Status:  Euvolemic   PONV Controlled:  Controlled   Airway Patency:  Patent  Airway: intubated   Two or more mitigation strategies used for obstructive sleep apnea   Post Op Vitals Reviewed: Yes      Staff: Anesthesiologist, CRNA         No complications documented      BP      Temp      Pulse     Resp      SpO2      /58   Pulse 72   Temp 98 2 °F (36 8 °C)   Resp 14   Ht 5' 4 25" (1 632 m)   Wt 99 9 kg (220 lb 3 8 oz)   SpO2 98%   BMI 37 51 kg/m²

## 2022-04-28 NOTE — OP NOTE
OPERATIVE REPORT  PATIENT NAME: Isacc Rodriguez    :  1995  MRN: [id number]  Pt Location: [facility] OR ROOM 02    SURGERY DATE: 2022    Surgeon(s) and Role:     * Mai Rueda MD - Primary     * Gay Felix MD - Assisting    Preop Diagnosis:  Cholecystitis [K81 9]    Post-Op Diagnosis Codes:     * Cholecystitis [K81 9]    Procedure(s) (LRB):  CHOLECYSTECTOMY LAPAROSCOPIC (N/A)    Specimen(s):  ID Type Source Tests Collected by Time Destination   1 : gallbladder Tissue Gallbladder TISSUE EXAM Mai Rueda MD 2022 1627        Estimated Blood Loss:   Minimal    Drains:  * No LDAs found *    Anesthesia Type:   General    Operative Indications:  Cholecystitis [K81 9]      Operative Findings:  Gallbladder Hydrops    Acute on Chronic Cholecystitis w/ adhesions to gallbladder from omentum    Short cystic duct    Complications:   None    Procedure and Technique:  Pt placed supine on table and prepped and draped in usual sterile manner  Timeout performed and all elements of timeout reviewed and confirmed  Laparoscopic equipment was checked and deemed adequate  An infraumbilical incision was made and a veress was inserted and insufflation to 15mmHg began  A 5mm port was inserted at the umbilicus and direct inspection only showed a frankly inflamed Gallbladder  The subxiphoid and right sided ports were then placed under direct visualization and the gallbladder was retracted cephalad and laterally  The cystic duct and cystic artery were carefully skeletonized until a critical view could be accomplished  The cystic duct was noted to be large and short thus the 10mm clip could not fit and we decided to staple across the cystic duct with a 45 mm white laparoscopic stapler load  This was performed cleanly and avoided the CBD  The cystic artery was subsequently clipped and cut  The gallbladder was removed from the gallbladder fossa with the laparoscopic hook and bovie cautery   It was placed in an Endocatch bag and removed through the infraumbilical port site without difficulty  The RUQ was irrigated with warm normal saline until the return fluid was clear of blood and bile  The ports were then removed under direct visualization without difficulty  The subxiphoid port site was closed with a figure of eight 0-vicryl sutures and the skin sites were closed with running 4-0 monocryl subcuticular suture and histocryl glue  Pt tolerated the procedure well, was transported to PACU in stable condition and sponge and instrument counts were correct         Dr Jaci Grullon was present for the entire procedure    Patient Disposition:  PACU       SIGNATURE: Lety Baker MD  DATE: April 28, 2022  TIME: 5:48 PM

## 2022-04-28 NOTE — PROGRESS NOTES
[address] [facility]  Progress Note - [place] Crease 1995, 27 y o  female MRN: [id number]  Unit/Bed#: E2 -01 Encounter: [id number]  Primary Care Provider: No primary care provider on file  Date and time admitted to hospital: 4/26/2022  6:47 PM    * Abdominal pain  Assessment & Plan  · Patient presented with acute right upper quadrant abdominal pain, nausea, vomiting, diarrhea  · Ultrasound with cholelithiasis with positive sonographic Arriaza sign, mild intrahepatic biliary ductal dilation, CBD measures 7 mm, no choledocholithiasis  · Elevated AST/ALT/alk-phos  · GI consult appreciated   · MRCP- Choledocholithiasis and cholelithiasis with mild-to-moderate intrahepatic biliary ductal dilatation  · Plan for ERCP today and possible cholecystectomy after procedure   · Continue NPO  · Continue Zosyn  · IV fluids, pain control  · Trend LFTs    Transaminitis  Assessment & Plan  · Likely related to acute cholecystitis, possible passed stone  · Trend LFTs  · Plan for ERCP today and then possible cholecystectomy after procedure     Obesity (BMI 30-39  9)  Assessment & Plan  · BMI 37 80  · Encourage lifestyle modifications         VTE Pharmacologic Prophylaxis: VTE Score: 4 Moderate Risk (Score 3-4) - Pharmacological DVT Prophylaxis Contraindicated  Sequential Compression Devices Ordered  Patient Centered Rounds: I performed bedside rounds with nursing staff today  Discussions with Specialists or Other Care Team Provider: d/w RN     Education and Discussions with Family / Patient: Patient declined call to   Time Spent for Care: 30 minutes  More than 50% of total time spent on counseling and coordination of care as described above      Current Length of Stay: 2 day(s)  Current Patient Status: Inpatient   Certification Statement: The patient will continue to require additional inpatient hospital stay due to plan for ERCP and cholecystectomy today   Discharge Plan: Anticipate discharge in 24-48 hrs to home  Code Status: Level 1 - Full Code    Subjective:   Pt lying in bed  Reports right upper quadrant abdominal pain 5/10  + nausea no vomiting  No bm since admission but + flatus  Denies sob or chest pain  Objective:     Vitals:   Temp (24hrs), Av 7 °F (36 5 °C), Min:97 5 °F (36 4 °C), Max:98 °F (36 7 °C)    Temp:  [97 5 °F (36 4 °C)-98 °F (36 7 °C)] 97 5 °F (36 4 °C)  HR:  [61-75] 70  Resp:  [18-20] 18  BP: (105-126)/(55-59) 106/59  SpO2:  [97 %-100 %] 98 %  Body mass index is 37 51 kg/m²  Input and Output Summary (last 24 hours): Intake/Output Summary (Last 24 hours) at 2022 1319  Last data filed at 2022 0442  Gross per 24 hour   Intake 240 ml   Output --   Net 240 ml       Physical Exam:   Physical Exam  Vitals and nursing note reviewed  Constitutional:       General: She is not in acute distress  Appearance: She is obese  Eyes:      General: Scleral icterus present  Cardiovascular:      Rate and Rhythm: Normal rate and regular rhythm  Heart sounds: Normal heart sounds  No murmur heard  Pulmonary:      Effort: Pulmonary effort is normal  No respiratory distress  Breath sounds: Normal breath sounds  No wheezing or rales  Abdominal:      General: Bowel sounds are normal  There is distension  Palpations: Abdomen is soft  Tenderness: There is abdominal tenderness (right upper quadrant abd pain )  Musculoskeletal:         General: No swelling or tenderness  Skin:     General: Skin is warm and dry  Neurological:      Mental Status: She is alert  Mental status is at baseline     Psychiatric:         Mood and Affect: Mood normal          Additional Data:     Labs:  Results from last 7 days   Lab Units 22  0432   WBC Thousand/uL 5 18   HEMOGLOBIN g/dL 11 0*   HEMATOCRIT % 34 6*   PLATELETS Thousands/uL 192   NEUTROS PCT % 70   LYMPHS PCT % 20   MONOS PCT % 7   EOS PCT % 3     Results from last 7 days   Lab Units 04/28/22  0432   SODIUM mmol/L 141   POTASSIUM mmol/L 3 3*   CHLORIDE mmol/L 106   CO2 mmol/L 22   BUN mg/dL 7   CREATININE mg/dL 0 85   ANION GAP mmol/L 13   CALCIUM mg/dL 7 8*   ALBUMIN g/dL 3 0*   TOTAL BILIRUBIN mg/dL 6 00*   ALK PHOS U/L 159*   ALT U/L 422*   AST U/L 194*   GLUCOSE RANDOM mg/dL 67     Results from last 7 days   Lab Units 04/27/22  0423   INR  0 98                   Lines/Drains:  Invasive Devices  Report    Peripheral Intravenous Line            Peripheral IV 04/27/22 Right Antecubital <1 day                      Imaging: Reviewed radiology reports from this admission including: MRI abdomen/MRCP and ultrasound(s)    Recent Cultures (last 7 days):         Last 24 Hours Medication List:   Current Facility-Administered Medications   Medication Dose Route Frequency Provider Last Rate    acetaminophen  650 mg Oral Q6H PRN Varsha Dance, DO      HYDROmorphone  0 5 mg Intravenous Q4H PRN Varsha Dance, DO      ondansetron  4 mg Intravenous Q4H PRN Varsha Dance, DO      ondansetron  4 mg Intravenous Once Serapio Hodgkin, MD      oxyCODONE  10 mg Oral Q4H PRN Varsha Dance, DO      oxyCODONE  5 mg Oral Q4H PRN Varsha Dance, DO      piperacillin-tazobactam  3 375 g Intravenous Q6H Varsha Dance, DO 3 375 g (04/28/22 0909)    sodium chloride  125 mL/hr Intravenous Continuous Varsha Dance,  mL/hr (04/27/22 2136)        Today, Patient Was Seen By: KOLBY Hawkins    **Please Note: This note may have been constructed using a voice recognition system  **

## 2022-04-29 VITALS
BODY MASS INDEX: 37.6 KG/M2 | SYSTOLIC BLOOD PRESSURE: 112 MMHG | DIASTOLIC BLOOD PRESSURE: 59 MMHG | OXYGEN SATURATION: 98 % | HEIGHT: 64 IN | HEART RATE: 58 BPM | TEMPERATURE: 97.9 F | RESPIRATION RATE: 16 BRPM | WEIGHT: 220.24 LBS

## 2022-04-29 PROBLEM — K80.43 CALCULUS OF BILE DUCT WITH ACUTE CHOLECYSTITIS AND OBSTRUCTION: Status: ACTIVE | Noted: 2022-04-26

## 2022-04-29 PROBLEM — R10.11 RIGHT UPPER QUADRANT ABDOMINAL PAIN: Status: ACTIVE | Noted: 2022-04-26

## 2022-04-29 LAB
ALBUMIN SERPL BCP-MCNC: 3 G/DL (ref 3.5–5)
ALP SERPL-CCNC: 177 U/L (ref 46–116)
ALT SERPL W P-5'-P-CCNC: 626 U/L (ref 12–78)
ANION GAP SERPL CALCULATED.3IONS-SCNC: 10 MMOL/L (ref 4–13)
AST SERPL W P-5'-P-CCNC: 204 U/L (ref 5–45)
BILIRUB SERPL-MCNC: 6.28 MG/DL (ref 0.2–1)
BUN SERPL-MCNC: 6 MG/DL (ref 5–25)
CALCIUM ALBUM COR SERPL-MCNC: 8.6 MG/DL (ref 8.3–10.1)
CALCIUM SERPL-MCNC: 7.8 MG/DL (ref 8.3–10.1)
CHLORIDE SERPL-SCNC: 105 MMOL/L (ref 100–108)
CO2 SERPL-SCNC: 23 MMOL/L (ref 21–32)
CREAT SERPL-MCNC: 0.9 MG/DL (ref 0.6–1.3)
ERYTHROCYTE [DISTWIDTH] IN BLOOD BY AUTOMATED COUNT: 13.3 % (ref 11.6–15.1)
GFR SERPL CREATININE-BSD FRML MDRD: 87 ML/MIN/1.73SQ M
GLUCOSE SERPL-MCNC: 123 MG/DL (ref 65–140)
HCT VFR BLD AUTO: 35.2 % (ref 34.8–46.1)
HGB BLD-MCNC: 11.5 G/DL (ref 11.5–15.4)
MCH RBC QN AUTO: 30.9 PG (ref 26.8–34.3)
MCHC RBC AUTO-ENTMCNC: 32.7 G/DL (ref 31.4–37.4)
MCV RBC AUTO: 95 FL (ref 82–98)
PLATELET # BLD AUTO: 209 THOUSANDS/UL (ref 149–390)
PMV BLD AUTO: 10.5 FL (ref 8.9–12.7)
POTASSIUM SERPL-SCNC: 3.6 MMOL/L (ref 3.5–5.3)
PROT SERPL-MCNC: 6.5 G/DL (ref 6.4–8.2)
RBC # BLD AUTO: 3.72 MILLION/UL (ref 3.81–5.12)
SODIUM SERPL-SCNC: 138 MMOL/L (ref 136–145)
WBC # BLD AUTO: 6.69 THOUSAND/UL (ref 4.31–10.16)

## 2022-04-29 PROCEDURE — 85027 COMPLETE CBC AUTOMATED: CPT | Performed by: SURGERY

## 2022-04-29 PROCEDURE — 99232 SBSQ HOSP IP/OBS MODERATE 35: CPT | Performed by: INTERNAL MEDICINE

## 2022-04-29 PROCEDURE — 80053 COMPREHEN METABOLIC PANEL: CPT | Performed by: SURGERY

## 2022-04-29 PROCEDURE — 99239 HOSP IP/OBS DSCHRG MGMT >30: CPT | Performed by: INTERNAL MEDICINE

## 2022-04-29 PROCEDURE — NC001 PR NO CHARGE: Performed by: SURGERY

## 2022-04-29 RX ORDER — ACETAMINOPHEN 325 MG/1
650 TABLET ORAL EVERY 6 HOURS PRN
Refills: 0 | Status: CANCELLED
Start: 2022-04-29

## 2022-04-29 RX ADMIN — OXYCODONE HYDROCHLORIDE 5 MG: 5 TABLET ORAL at 00:49

## 2022-04-29 RX ADMIN — OXYCODONE HYDROCHLORIDE 5 MG: 5 TABLET ORAL at 12:30

## 2022-04-29 RX ADMIN — OXYCODONE HYDROCHLORIDE 5 MG: 5 TABLET ORAL at 07:59

## 2022-04-29 NOTE — PROGRESS NOTES
Progress Note- Vinny Jones 27 y o  female MRN: [id number]    Unit/Bed#: E2 -01 Encounter: [id number]      Assessment and Plan:    Phani Mccall is a 26 y/o female who presents with RUQ pain and n/v/d       1  Acute calculus cholecystitis  2  Biliary dilation   3  Transaminitis  Pt presents with RUQ pain and n/v/d; pt notes the pain and n/v have been intermittent since Friday but the diarrhea only occurred on Monday and she has not had any since  Pt notes the RUQ pain worsened yesterday, which prompted ED eval  RUQ u/s noted cholelithiasis with + Arriaza's, suspicious for acute cholecystitis and suspected mild intrahepatic biliary ductal dilation without any CBD stones noted  Pt had elevated LFTs and MRCP confirmed choledocholithiasis and cholelithiasis  ERCP completed yesterday and she underwent lap lynne after this, LFTs today:  (194 yesterday),  (422 yesterday),  (159 yesterday), T bili 6 28 (6 00 yesterday)  Pt is "sore in incisional areas but otherwise denies any further pain, and was able to tolerate breakfast without n/v  Pt is anxious to go home and does not wasn't to stay to monitor her LFTs  -I explained to pt that her LFTs increasing could be due to biliary instrumentation and cholecystectomy; I explained that we could keep here here to further monitor but she would like to go home   -discussed with SLIM: CMP to be dine as an OP in 5-7 days; pt says she will get this done   -I asked pt to monitor for signs of pancreatitis and return to the ED (abdominal pain, n/v, fevers/chills)   -I will send a message to our office staff to have her follow-up with us due to her elevated LFTs  -avoid hepatoxic meds, tylenol and alcohol during this time        ______________________________________________________________________    Subjective:     No further abdominal pain but pt notes she is sore where the incisions are  No n/v and is tolerating food well  Pt would like to go home  Medication Administration - last 24 hours from 04/28/2022 1003 to 04/29/2022 1003       Date/Time Order Dose Route Action Action by     04/29/2022 0808 sodium chloride 0 9 % infusion 0 mL/hr Intravenous Stopped Delgado Sánchez, RN     04/28/2022 1831 sodium chloride 0 9 % infusion 125 mL/hr Intravenous [address] Cassi Chase, RN     04/28/2022 1746 sodium chloride 0 9 % infusion 125 mL/hr Intravenous Continued from [address] Xin, RN     04/28/2022 1738 sodium chloride 0 9 % infusion   Intravenous Anesthesia Volume Adjustment Clemetine Nipple, CRNA     04/28/2022 1555 sodium chloride 0 9 % infusion   Intravenous New Bag Clemetine Nipple, CRNA     04/28/2022 1554 sodium chloride 0 9 % infusion   Intravenous Paused Clemetine Nipple, CRNA     04/28/2022 1454 sodium chloride 0 9 % infusion   Intravenous Continue from [address] [facility], CRNA     04/28/2022 1430 sodium chloride 0 9 % infusion 125 mL/hr Intravenous Handoff Slick Neale, RN     04/28/2022 1336 sodium chloride 0 9 % infusion 125 mL/hr Intravenous [address], RN     04/28/2022 1727 acetaminophen (TYLENOL) tablet 650 mg   Oral SANDI Bates MD     04/28/2022 1426 acetaminophen (TYLENOL) tablet 650 mg   Oral MAR Hold Automatic Transfer Provider     04/28/2022 1727 ondansetron (ZOFRAN) injection 4 mg   Intravenous SANDI Bates MD     04/28/2022 1426 ondansetron (ZOFRAN) injection 4 mg   Intravenous MAR Hold Automatic Transfer Provider     04/28/2022 1337 ondansetron (ZOFRAN) injection 4 mg 4 mg Intravenous Given Delgado Sánchez RN     04/29/2022 0759 oxyCODONE (ROXICODONE) IR tablet 5 mg 5 mg Oral Given Delgado Sánchez RN     04/29/2022 0049 oxyCODONE (ROXICODONE) IR tablet 5 mg 5 mg Oral Given Andrey Vogel, SKYLER     04/28/2022 1727 oxyCODONE (ROXICODONE) IR tablet 5 mg   Oral SANDI Bates MD     04/28/2022 1426 oxyCODONE (ROXICODONE) IR tablet 5 mg   Oral [facility] Hold Automatic Transfer Provider     04/28/2022 2047 oxyCODONE (ROXICODONE) immediate release tablet 10 mg 10 mg Oral Given Fay Valverde RN     04/28/2022 1727 oxyCODONE (ROXICODONE) immediate release tablet 10 mg   Oral SANDI Fontenot MD     04/28/2022 1426 oxyCODONE (ROXICODONE) immediate release tablet 10 mg   Oral MAR Hold Automatic Transfer Provider     04/28/2022 1140 oxyCODONE (ROXICODONE) immediate release tablet 10 mg 10 mg Oral Given Kimmie Mccarthy RN     04/28/2022 1727 HYDROmorphone (DILAUDID) injection 0 5 mg   Intravenous SANDI Fontenot MD     04/28/2022 1426 HYDROmorphone (DILAUDID) injection 0 5 mg   Intravenous MAR Hold Automatic Transfer Provider     04/28/2022 1727 piperacillin-tazobactam (ZOSYN) 3 375 g in sodium chloride 0 9 % 100 mL IVPB   Intravenous SANDI Fontenot MD     04/28/2022 1600 piperacillin-tazobactam (ZOSYN) 3 375 g in sodium chloride 0 9 % 100 mL IVPB   Intravenous Dose Auto Held Automatic Transfer Provider     04/28/2022 1454 piperacillin-tazobactam (ZOSYN) 3 375 g in sodium chloride 0 9 % 100 mL IVPB 3 375 g Intravenous New Bag [place]     04/28/2022 1426 piperacillin-tazobactam (ZOSYN) 3 375 g in sodium chloride 0 9 % 100 mL IVPB   Intravenous MAR Hold Automatic Transfer Provider     04/28/2022 1727 ondansetron (ZOFRAN) injection 4 mg   Intravenous SANDI Fontenot MD     04/28/2022 1426 ondansetron (ZOFRAN) injection 4 mg   Intravenous MAR Hold Automatic Transfer Provider     04/28/2022 1810 fentaNYL (SUBLIMAZE) injection 50 mcg 50 mcg Intravenous Given Meghann Cox RN     04/28/2022 1809 ondansetron (ZOFRAN) injection 4 mg 4 mg Intravenous Not Given Meghann Cox RN     04/28/2022 1521 indomethacin (INDOCIN) rectal suppository 100 mg Rectal Given Mariano Dumas RN     04/28/2022 1525 iohexol (OMNIPAQUE) 240 MG/ML solution 50 mL 50 mL Other Given Toney Reynolds     04/28/2022 1818 promethazine (PHENERGAN) injection 12 5 mg 12 5 mg Intravenous Given Meghann Cox RN Objective:     Vitals: Blood pressure 112/59, pulse 58, temperature 97 9 °F (36 6 °C), temperature source Temporal, resp  rate 16, height 5' 4 25" (1 632 m), weight 99 9 kg (220 lb 3 8 oz), SpO2 98 %  ,Body mass index is 37 51 kg/m²        Intake/Output Summary (Last 24 hours) at 4/29/2022 1003  Last data filed at 4/28/2022 1831  Gross per 24 hour   Intake 2150 ml   Output 10 ml   Net 2140 ml       Physical Exam:   General Appearance: Awake and alert, in no acute distress  Abdomen: Soft, non-tender, non-distended; bowel sounds normal; no masses or no organomegaly    Invasive Devices  Report    Peripheral Intravenous Line            Peripheral IV 04/27/22 Right Antecubital 1 day    Peripheral IV 04/28/22 Left Hand 1 day                Lab Results:  Admission on 04/26/2022   Component Date Value    EXT PREG TEST UR (Ref: N* 04/26/2022 NEG     Control 04/26/2022 VALID     Color, UA 04/26/2022 Yellow     Clarity, UA 04/26/2022 Cloudy     pH, UA 04/26/2022 5 5     Leukocytes, UA 04/26/2022 Negative     Nitrite, UA 04/26/2022 Negative     Protein, UA 04/26/2022 Negative     Glucose, UA 04/26/2022 Negative     Ketones, UA 04/26/2022 Trace*    Urobilinogen, UA 04/26/2022 0 2     Bilirubin, UA 04/26/2022 Interference- unable to analyze*    Blood, UA 04/26/2022 Small*    Specific Gravity, UA 04/26/2022 >=1 030     RBC, UA 04/26/2022 1-2*    WBC, UA 04/26/2022 4-10*    Epithelial Cells 04/26/2022 Moderate*    Bacteria, UA 04/26/2022 Moderate*    Fine granular casts 04/26/2022 0-1     MUCUS THREADS 04/26/2022 Moderate*    WBC 04/26/2022 5 61     RBC 04/26/2022 4 40     Hemoglobin 04/26/2022 14 1     Hematocrit 04/26/2022 42 1     MCV 04/26/2022 96     MCH 04/26/2022 32 0     MCHC 04/26/2022 33 5     RDW 04/26/2022 12 9     MPV 04/26/2022 10 1     Platelets 19/37/7800 248     nRBC 04/26/2022 0     Neutrophils Relative 04/26/2022 69     Immat GRANS % 04/26/2022 0     Lymphocytes Relative 04/26/2022 20     Monocytes Relative 04/26/2022 7     Eosinophils Relative 04/26/2022 3     Basophils Relative 04/26/2022 1     Neutrophils Absolute 04/26/2022 3 93     Immature Grans Absolute 04/26/2022 0 02     Lymphocytes Absolute 04/26/2022 1 10     Monocytes Absolute 04/26/2022 0 37     Eosinophils Absolute 04/26/2022 0 16     Basophils Absolute 04/26/2022 0 03     Sodium 04/26/2022 137     Potassium 04/26/2022 3 4*    Chloride 04/26/2022 102     CO2 04/26/2022 26     ANION GAP 04/26/2022 9     BUN 04/26/2022 6     Creatinine 04/26/2022 0 75     Glucose 04/26/2022 95     Calcium 04/26/2022 8 7     AST 04/26/2022 80*    ALT 04/26/2022 299*    Alkaline Phosphatase 04/26/2022 205*    Total Protein 04/26/2022 7 9     Albumin 04/26/2022 4 0     Total Bilirubin 04/26/2022 6 71*    eGFR 04/26/2022 108     Lipase 04/26/2022 87     Hepatitis B Surface Ag 04/26/2022 Non-reactive     Hep A IgM 04/26/2022 Non-reactive     Hepatitis C Ab 04/26/2022 Non-reactive     Hep B C IgM 04/26/2022 Non-reactive     Sodium 04/27/2022 140     Potassium 04/27/2022 3 2*    Chloride 04/27/2022 105     CO2 04/27/2022 21     ANION GAP 04/27/2022 14*    BUN 04/27/2022 7     Creatinine 04/27/2022 0 74     Glucose 04/27/2022 77     Calcium 04/27/2022 8 3     Corrected Calcium 04/27/2022 8 8     AST 04/27/2022 94*    ALT 04/27/2022 271*    Alkaline Phosphatase 04/27/2022 175*    Total Protein 04/27/2022 6 8     Albumin 04/27/2022 3 4*    Total Bilirubin 04/27/2022 6 04*    eGFR 04/27/2022 110     WBC 04/27/2022 4 99     RBC 04/27/2022 4 00     Hemoglobin 04/27/2022 12 2     Hematocrit 04/27/2022 37 7     MCV 04/27/2022 94     MCH 04/27/2022 30 5     MCHC 04/27/2022 32 4     RDW 04/27/2022 13 1     MPV 04/27/2022 10 7     Platelets 47/58/7817 236     nRBC 04/27/2022 0     Neutrophils Relative 04/27/2022 62     Immat GRANS % 04/27/2022 0     Lymphocytes Relative 04/27/2022 27     Monocytes Relative 04/27/2022 8     Eosinophils Relative 04/27/2022 3     Basophils Relative 04/27/2022 0     Neutrophils Absolute 04/27/2022 3 07     Immature Grans Absolute 04/27/2022 0 02     Lymphocytes Absolute 04/27/2022 1 35     Monocytes Absolute 04/27/2022 0 38     Eosinophils Absolute 04/27/2022 0 15     Basophils Absolute 04/27/2022 0 02     Protime 04/27/2022 12 8     INR 04/27/2022 0 98     WBC 04/28/2022 5 18     RBC 04/28/2022 3 58*    Hemoglobin 04/28/2022 11 0*    Hematocrit 04/28/2022 34 6*    MCV 04/28/2022 97     MCH 04/28/2022 30 7     MCHC 04/28/2022 31 8     RDW 04/28/2022 13 3     MPV 04/28/2022 11 1     Platelets 22/15/2814 192     nRBC 04/28/2022 0     Neutrophils Relative 04/28/2022 70     Immat GRANS % 04/28/2022 0     Lymphocytes Relative 04/28/2022 20     Monocytes Relative 04/28/2022 7     Eosinophils Relative 04/28/2022 3     Basophils Relative 04/28/2022 0     Neutrophils Absolute 04/28/2022 3 60     Immature Grans Absolute 04/28/2022 0 02     Lymphocytes Absolute 04/28/2022 1 03     Monocytes Absolute 04/28/2022 0 37     Eosinophils Absolute 04/28/2022 0 14     Basophils Absolute 04/28/2022 0 02     Sodium 04/28/2022 141     Potassium 04/28/2022 3 3*    Chloride 04/28/2022 106     CO2 04/28/2022 22     ANION GAP 04/28/2022 13     BUN 04/28/2022 7     Creatinine 04/28/2022 0 85     Glucose 04/28/2022 67     Calcium 04/28/2022 7 8*    Corrected Calcium 04/28/2022 8 6     AST 04/28/2022 194*    ALT 04/28/2022 422*    Alkaline Phosphatase 04/28/2022 159*    Total Protein 04/28/2022 6 0*    Albumin 04/28/2022 3 0*    Total Bilirubin 04/28/2022 6 00*    eGFR 04/28/2022 93     ABO Grouping 04/28/2022 O     Rh Factor 04/28/2022 Positive     Antibody Screen 04/28/2022 Negative     Specimen Expiration Date 04/28/2022 [id number]     ABO Grouping 04/28/2022 O     Rh Factor 04/28/2022 Positive     Sodium 04/29/2022 138     Potassium 04/29/2022 3 6     Chloride 04/29/2022 105     CO2 04/29/2022 23     ANION GAP 04/29/2022 10     BUN 04/29/2022 6     Creatinine 04/29/2022 0 90     Glucose 04/29/2022 123     Calcium 04/29/2022 7 8*    Corrected Calcium 04/29/2022 8 6     AST 04/29/2022 204*    ALT 04/29/2022 626*    Alkaline Phosphatase 04/29/2022 177*    Total Protein 04/29/2022 6 5     Albumin 04/29/2022 3 0*    Total Bilirubin 04/29/2022 6 28*    eGFR 04/29/2022 87     WBC 04/29/2022 6 69     RBC 04/29/2022 3 72*    Hemoglobin 04/29/2022 11 5     Hematocrit 04/29/2022 35 2     MCV 04/29/2022 95     MCH 04/29/2022 30 9     MCHC 04/29/2022 32 7     RDW 04/29/2022 13 3     Platelets 52/89/2664 209     MPV 04/29/2022 10 5        Imaging Studies: I have personally reviewed pertinent imaging studies

## 2022-04-29 NOTE — PLAN OF CARE
Problem: PAIN - ADULT  Goal: Verbalizes/displays adequate comfort level or baseline comfort level  Description: Interventions:  - Encourage patient to monitor pain and request assistance  - Assess pain using appropriate pain scale  - Administer analgesics based on type and severity of pain and evaluate response  - Implement non-pharmacological measures as appropriate and evaluate response  - Consider cultural and social influences on pain and pain management  - Notify physician/advanced practitioner if interventions unsuccessful or patient reports new pain  Outcome: Progressing     Problem: INFECTION - ADULT  Goal: Absence or prevention of progression during hospitalization  Description: INTERVENTIONS:  - Assess and monitor for signs and symptoms of infection  - Monitor lab/diagnostic results  - Monitor all insertion sites, i e  indwelling lines, tubes, and drains  - Monitor endotracheal if appropriate and nasal secretions for changes in amount and color  - [place] appropriate cooling/warming therapies per order  - Administer medications as ordered  - Instruct and encourage patient and family to use good hand hygiene technique  - Identify and instruct in appropriate isolation precautions for identified infection/condition  Outcome: Progressing  Goal: Absence of fever/infection during neutropenic period  Description: INTERVENTIONS:  - Monitor WBC    Outcome: Progressing     Problem: SAFETY ADULT  Goal: Patient will remain free of falls  Description: INTERVENTIONS:  - Educate patient/family on patient safety including physical limitations  - Instruct patient to call for assistance with activity   - Consult OT/PT to assist with strengthening/mobility   - Keep Call bell within reach  - Keep bed low and locked with side rails adjusted as appropriate  - Keep care items and personal belongings within reach  - Initiate and maintain comfort rounds  - Make Fall Risk Sign visible to staff  - Apply yellow socks and bracelet for high fall risk patients  - Consider moving patient to room near nurses station  Outcome: Progressing  Goal: Maintain or return to baseline ADL function  Description: INTERVENTIONS:  -  Assess patient's ability to carry out ADLs; assess patient's baseline for ADL function and identify physical deficits which impact ability to perform ADLs (bathing, care of mouth/teeth, toileting, grooming, dressing, etc )  - Assess/evaluate cause of self-care deficits   - Assess range of motion  - Assess patient's mobility; develop plan if impaired  - Assess patient's need for assistive devices and provide as appropriate  - Encourage maximum independence but intervene and supervise when necessary  - Involve family in performance of ADLs  - Assess for home care needs following discharge   - Consider OT consult to assist with ADL evaluation and planning for discharge  - Provide patient education as appropriate  Outcome: Progressing  Goal: Maintains/Returns to pre admission functional level  Description: INTERVENTIONS:  - Perform BMAT or MOVE assessment daily    - Set and communicate daily mobility goal to care team and patient/family/caregiver  - Collaborate with rehabilitation services on mobility goals if consulted  - Perform Range of Motion 3 times a day  - Reposition patient every 2 hours    - Dangle patient 3 times a day  - Stand patient 3 times a day  - Ambulate patient 3 times a day  - Out of bed to chair 3 times a day   - Out of bed for meals 3 times a day  - Out of bed for toileting  - Record patient progress and toleration of activity level   Outcome: Progressing     Problem: DISCHARGE PLANNING  Goal: Discharge to home or other facility with appropriate resources  Description: INTERVENTIONS:  - Identify barriers to discharge w/patient and caregiver  - Arrange for needed discharge resources and transportation as appropriate  - Identify discharge learning needs (meds, wound care, etc )  - Arrange for interpretive services to assist at discharge as needed  - Refer to Case Management Department for coordinating discharge planning if the patient needs post-hospital services based on physician/advanced practitioner order or complex needs related to functional status, cognitive ability, or social support system  Outcome: Progressing     Problem: Knowledge Deficit  Goal: Patient/family/caregiver demonstrates understanding of disease process, treatment plan, medications, and discharge instructions  Description: Complete learning assessment and assess knowledge base    Interventions:  - Provide teaching at level of understanding  - Provide teaching via preferred learning methods  Outcome: Progressing     Problem: METABOLIC, FLUID AND ELECTROLYTES - ADULT  Goal: Electrolytes maintained within normal limits  Description: INTERVENTIONS:  - Monitor labs and assess patient for signs and symptoms of electrolyte imbalances  - Administer electrolyte replacement as ordered  - Monitor response to electrolyte replacements, including repeat lab results as appropriate  - Instruct patient on fluid and nutrition as appropriate  Outcome: Progressing  Goal: Fluid balance maintained  Description: INTERVENTIONS:  - Monitor labs   - Monitor I/O and WT  - Instruct patient on fluid and nutrition as appropriate  - Assess for signs & symptoms of volume excess or deficit  Outcome: Progressing  Goal: Glucose maintained within target range  Description: INTERVENTIONS:  - Monitor Blood Glucose as ordered  - Assess for signs and symptoms of hyperglycemia and hypoglycemia  - Administer ordered medications to maintain glucose within target range  - Assess nutritional intake and initiate nutrition service referral as needed  Outcome: Progressing

## 2022-04-29 NOTE — PROGRESS NOTES
Progress Note    Glenna Chow 27 y o  female MRN: [id number]  Unit/Bed#: E2 -[facility] Encounter: [id number]    Assessment:  28-yr old F w/ cholecystitis/choledocholithiasis; now s/p ERCP, lap cholecystectomy: 4/28  Stable VS in room air/afebrile  Total bilirubin: 6 3 (6 0)  White count: 6 7  PLAN:  - low fat diet   - OOB, ambulate  - trend LFTs  Subjective:   - no new complaints  - abdominal pain & pruritus improved  Objective:     Vitals: Temp:  [97 3 °F (36 3 °C)-98 5 °F (36 9 °C)] 97 9 °F (36 6 °C)  HR:  [56-94] 58  Resp:  [12-18] 16  BP: (106-136)/(58-71) 112/59  Body mass index is 37 51 kg/m²  I/O       04/27 0701  04/28 0700 04/28 0701  04/29 0700 04/29 0701  04/30 0700    P  O  240      I V  (mL/kg)  2150 (21 5)     Total Intake(mL/kg) 240 (2 4) 2150 (21 5)     Urine (mL/kg/hr)  0 (0)     Blood  10     Total Output  10     Net +240 +2140            Unmeasured Urine Occurrence 2 x 2 x           Physical Exam:  GEN: NAD  HEENT: atraumatic  CV: RRR  Lung: Normal effort  Ab: Soft, ND, appro tender  Incisions cdi  Extrem: No CCE  Neuro: A+Ox3    Lab, Imaging and other studies:   I have personally reviewed pertinent reports    , CBC with diff:   Lab Results   Component Value Date    WBC 6 69 04/29/2022    HGB 11 5 04/29/2022    HCT 35 2 04/29/2022    MCV 95 04/29/2022     04/29/2022    MCH 30 9 04/29/2022    MCHC 32 7 04/29/2022    RDW 13 3 04/29/2022    MPV 10 5 04/29/2022   , BMP/CMP:   Lab Results   Component Value Date    SODIUM 138 04/29/2022    K 3 6 04/29/2022     04/29/2022    CO2 23 04/29/2022    BUN 6 04/29/2022    CREATININE 0 90 04/29/2022    CALCIUM 7 8 (L) 04/29/2022     (H) 04/29/2022     (H) 04/29/2022    ALKPHOS 177 (H) 04/29/2022    EGFR 87 04/29/2022   , Magnesium: No components found for: MAG  VTE Pharmacologic Prophylaxis: Sequential compression device (Venodyne)   VTE Mechanical Prophylaxis: sequential compression device

## 2022-04-29 NOTE — ASSESSMENT & PLAN NOTE
· MRCP- Choledocholithiasis and cholelithiasis with mild-to-moderate intrahepatic biliary ductal dilatation     · S/p ERCP 4/28 with removal of common bile duct stone   · S/p cholecystectomy 4/28 after ERCP   · US of the abdomen showing mild hepatic steatosis  · ast 80--> 94--> 194-->204  · -->271-->422-->626  · tbili 6 71-->6 04-->6 00-->6 28  · Will need f/u CMP 1 week after discharge- discussed with Dr Delta Pérez and Mark Juárez, will have f/u with GI after discharge and surgery within 2 weeks   · Pt was educated to avoid tylenol or use sparingly after discharge, also educated on jaundice and signs of worsening liver function to return to the ER

## 2022-04-29 NOTE — ASSESSMENT & PLAN NOTE
· MRCP- Choledocholithiasis and cholelithiasis with mild-to-moderate intrahepatic biliary ductal dilatation     · S/p ERCP on 4/28 with removal of common bile duct stone   · S/p cholecystectomy on 4/28   · Started on low fat diet   · F/u with surgery after discharge within 2 weeks   · Advised not to drive on oxycodone

## 2022-04-29 NOTE — DISCHARGE SUMMARY
[facility]  Discharge- Jonatan Ware 1995, 27 y o  female MRN: [id number]  Unit/Bed#: E2 -01 Encounter: [id number]  Primary Care Provider: No primary care provider on file  Date and time admitted to hospital: 4/26/2022  6:47 PM    * Calculus of bile duct with acute cholecystitis and obstruction  Assessment & Plan  · MRCP- Choledocholithiasis and cholelithiasis with mild-to-moderate intrahepatic biliary ductal dilatation  · S/p ERCP on 4/28 with removal of common bile duct stone   · S/p cholecystectomy on 4/28   · Started on low fat diet   · F/u with surgery after discharge within 2 weeks   · Advised not to drive on oxycodone     Transaminitis  Assessment & Plan  · MRCP- Choledocholithiasis and cholelithiasis with mild-to-moderate intrahepatic biliary ductal dilatation  · S/p ERCP 4/28 with removal of common bile duct stone   · S/p cholecystectomy 4/28 after ERCP   · US of the abdomen showing mild hepatic steatosis  · ast 80--> 94--> 194-->204  · -->271-->422-->626  · tbili 6 71-->6 04-->6 00-->6 28  · Will need f/u CMP 1 week after discharge- discussed with Dr Nicole Tidwell and Rolando Pollack, will have f/u with GI after discharge and surgery within 2 weeks   · Pt was educated to avoid tylenol or use sparingly after discharge, also educated on jaundice and signs of worsening liver function to return to the ER     Obesity (BMI 30-39  9)  Assessment & Plan  · BMI 37 80  · Encourage lifestyle modifications       Medical Problems             Resolved Problems  Date Reviewed: 4/29/2022    None              Discharging Physician / Practitioner: KOLBY Butler  PCP: No primary care provider on file    Admission Date:   Admission Orders (From admission, onward)     Ordered        04/26/22 2045  Inpatient Admission  Once                      Discharge Date: 04/29/22    Consultations During Hospital Stay:  · Surgery   · GI     Procedures Performed:   · 4/28 Lap lynne · 4/28 ERCP common duct stone removal   · MRI/MRCP abdomen 4/27- Choledocholithiasis and cholelithiasis with mild-to-moderate intrahepatic biliary ductal dilatation  · 4/26 right upper quadrant US- Cholelithiasis with positive sonographic Arriaza sign and suspected mild intrahepatic biliary ductal dilatation  Findings may represent acute cholecystitis, correlation with nuclear medicine HIDA scan can be considered for further assessment   Correlation with patient's LFTs is advised  Mild increased echogenicity of the liver most consistent with mild hepatic steatosis  · ast 80--> 94--> 194-->204  · -->271-->422-->626  · tbili 6 71-->6 04-->6 00-->6 28  · Hepatitis panels negative     Significant Findings / Test Results:   · Acute cholecystitis, obstructing stone in common bile duct and transaminitis     Incidental Findings:   · None     Test Results Pending at Discharge (will require follow up): · None      Outpatient Tests Requested:  · CMP in one week     Complications:  None     Reason for Admission: abdominal pain     Hospital Course:   Vinny Jones is a 27 y o  female patient with PMH of obesity who originally presented to the hospital on 4/26/2022 due to acute onset abdominal pain  The patient was admitted to the hospital for acute cholecystitis, obstructing stone in common bile duct and transaminitis  The patient was started on  Zosyn with right upper quadrant US showing acute cholecystitis  The patient went for a MRI/MRCP choledocholithiasis and cholelithiasis with mild-to-moderate intrahepatic biliary ductal dilatation  The patient went for an ERCP with common duct stone removal with GI and then cholecystectomy on 4/28  It will be recommended the patient follow a low fat diet after discharge, she will need outpatient f/u with surgery after discharge within 2 weeks   Liver enzymes remain elevated at discharge, will need repeat CMP in one week, cleared for discharge by Dr Ozzie Burr with GI, will arrange for f/u with GI after discharge  Please see above list of diagnoses and related plan for additional information  Condition at Discharge: good    Discharge Day Visit / Exam:   Subjective:  Pt reports she is feeling so much better today  Rates her incisional pain to be a 4/10 but reports its a "healing" pain  Denies any n/v and tolerating low fat diet without difficulty  Vitals: Blood Pressure: 112/59 (04/29/22 0734)  Pulse: 58 (04/29/22 0734)  Temperature: 97 9 °F (36 6 °C) (04/29/22 0734)  Temp Source: Temporal (04/29/22 0734)  Respirations: 16 (04/29/22 0734)  Height: 5' 4 25" (163 2 cm) (04/26/22 2123)  Weight - Scale: 99 9 kg (220 lb 3 8 oz) (04/26/22 2123)  SpO2: 98 % (04/29/22 0734)  Exam:   Physical Exam  Vitals and nursing note reviewed  Constitutional:       General: She is not in acute distress  Appearance: She is obese  HENT:      Head: Normocephalic and atraumatic  Cardiovascular:      Rate and Rhythm: Normal rate and regular rhythm  Heart sounds: Normal heart sounds  No murmur heard  Pulmonary:      Effort: Pulmonary effort is normal  No respiratory distress  Breath sounds: Normal breath sounds  No wheezing or rales  Abdominal:      General: Bowel sounds are normal  There is no distension  Palpations: Abdomen is soft  Tenderness: There is abdominal tenderness (incisional )  Skin:     General: Skin is warm and dry  Comments: Incisions open to air with dermabond, no erythema or drainage noted    Neurological:      General: No focal deficit present  Mental Status: She is alert  Mental status is at baseline  Psychiatric:         Mood and Affect: Mood normal           Discussion with Family: Patient declined call to   Discharge instructions/Information to patient and family:   See after visit summary for information provided to patient and family        Provisions for Follow-Up Care:  See after visit summary for information related to follow-up care and any pertinent home health orders  Disposition:   Home    Planned Readmission: no     Discharge Statement:  I spent 35 minutes discharging the patient  This time was spent on the day of discharge  I had direct contact with the patient on the day of discharge  Greater than 50% of the total time was spent examining patient, answering all patient questions, arranging and discussing plan of care with patient as well as directly providing post-discharge instructions  Additional time then spent on discharge activities  Discharge Medications:  See after visit summary for reconciled discharge medications provided to patient and/or family        **Please Note: This note may have been constructed using a voice recognition system**

## 2022-05-02 ENCOUNTER — TELEPHONE (OUTPATIENT)
Dept: GASTROENTEROLOGY | Facility: MEDICAL CENTER | Age: 27
End: 2022-05-02

## 2022-05-02 NOTE — TELEPHONE ENCOUNTER
----- Message from Radhika Gómez PA-C sent at 4/29/2022 10:11 AM EDT -----  Pt is to be discharegd today  Please call her to schedule follow-up in office in 3 weeks  Thank you!

## 2022-05-03 ENCOUNTER — APPOINTMENT (OUTPATIENT)
Dept: LAB | Facility: MEDICAL CENTER | Age: 27
End: 2022-05-03
Payer: COMMERCIAL

## 2022-05-03 DIAGNOSIS — K81.9 CHOLECYSTITIS: ICD-10-CM

## 2022-05-03 DIAGNOSIS — R74.01 TRANSAMINITIS: ICD-10-CM

## 2022-05-03 LAB
ALBUMIN SERPL BCP-MCNC: 3.5 G/DL (ref 3.5–5)
ALP SERPL-CCNC: 180 U/L (ref 46–116)
ALT SERPL W P-5'-P-CCNC: 468 U/L (ref 12–78)
ANION GAP SERPL CALCULATED.3IONS-SCNC: 9 MMOL/L (ref 4–13)
AST SERPL W P-5'-P-CCNC: 114 U/L (ref 5–45)
BILIRUB SERPL-MCNC: 2.82 MG/DL (ref 0.2–1)
BUN SERPL-MCNC: 16 MG/DL (ref 5–25)
CALCIUM SERPL-MCNC: 9.1 MG/DL (ref 8.3–10.1)
CHLORIDE SERPL-SCNC: 102 MMOL/L (ref 100–108)
CO2 SERPL-SCNC: 26 MMOL/L (ref 21–32)
CREAT SERPL-MCNC: 0.8 MG/DL (ref 0.6–1.3)
GFR SERPL CREATININE-BSD FRML MDRD: 100 ML/MIN/1.73SQ M
GLUCOSE P FAST SERPL-MCNC: 86 MG/DL (ref 65–99)
POTASSIUM SERPL-SCNC: 4.2 MMOL/L (ref 3.5–5.3)
PROT SERPL-MCNC: 7.6 G/DL (ref 6.4–8.2)
SODIUM SERPL-SCNC: 137 MMOL/L (ref 136–145)

## 2022-05-03 PROCEDURE — 36415 COLL VENOUS BLD VENIPUNCTURE: CPT

## 2022-05-03 PROCEDURE — 80053 COMPREHEN METABOLIC PANEL: CPT

## 2022-05-04 ENCOUNTER — TELEPHONE (OUTPATIENT)
Dept: FAMILY MEDICINE CLINIC | Facility: CLINIC | Age: 27
End: 2022-05-04

## 2022-05-04 NOTE — TELEPHONE ENCOUNTER
----- Message from Esteban Sanchez MD sent at 5/4/2022  5:46 AM EDT -----  Please call patient to discuss that labs showed transaminitis that is improving but still elevated make sure patient has follow-up with GI

## 2022-05-06 ENCOUNTER — OFFICE VISIT (OUTPATIENT)
Dept: FAMILY MEDICINE CLINIC | Facility: CLINIC | Age: 27
End: 2022-05-06
Payer: COMMERCIAL

## 2022-05-06 VITALS
WEIGHT: 219 LBS | HEIGHT: 64 IN | HEART RATE: 71 BPM | TEMPERATURE: 97.7 F | BODY MASS INDEX: 37.39 KG/M2 | DIASTOLIC BLOOD PRESSURE: 70 MMHG | SYSTOLIC BLOOD PRESSURE: 100 MMHG | OXYGEN SATURATION: 99 %

## 2022-05-06 DIAGNOSIS — K80.43 CALCULUS OF BILE DUCT WITH ACUTE CHOLECYSTITIS AND OBSTRUCTION: ICD-10-CM

## 2022-05-06 DIAGNOSIS — R74.01 TRANSAMINITIS: Primary | ICD-10-CM

## 2022-05-06 PROBLEM — E66.3 OVERWEIGHT: Status: ACTIVE | Noted: 2019-11-20

## 2022-05-06 PROBLEM — L73.9 FOLLICULITIS: Status: ACTIVE | Noted: 2020-06-09

## 2022-05-06 PROBLEM — Z00.00 PREVENTATIVE HEALTH CARE: Status: ACTIVE | Noted: 2019-11-20

## 2022-05-06 PROCEDURE — 99203 OFFICE O/P NEW LOW 30 MIN: CPT | Performed by: FAMILY MEDICINE

## 2022-05-09 NOTE — PROGRESS NOTES
Assessment/Plan:    40-year-old woman with:  Status post cholecystectomy and ERCP for choledocholithiasis with transaminitis discussed workup and treatment options with risks and benefits will check follow-up labs encouraged follow-up with her specialist discussed supportive care return parameters advised to call back if not improving worsening    No problem-specific Assessment & Plan notes found for this encounter  Diagnoses and all orders for this visit:    Transaminitis  -     CBC and differential; Future  -     Comprehensive metabolic panel; Future    Calculus of bile duct with acute cholecystitis and obstruction  -     CBC and differential; Future  -     Comprehensive metabolic panel; Future    Other orders  -     levonorgestrel (MIRENA) 20 MCG/24HR IUD; 1 each by Intrauterine route          Subjective:     Chief Complaint   Patient presents with   [address]     new patient establish care    Post-op     Cholecystectomy 4/29/22    Annual Exam     Annual physical        Patient ID: Mckenna Mcnally is a 27 y o  female  Patient is a 40-year-old woman who presents to establish care in this practice she admits that she was in the hospital because of acute pain and she had urgent surgery she had transaminitis due to choledocholithiasis was treated with ERCP as well she admits that she is getting better she is about 80-90% better no fevers chills nausea vomiting tolerating p o  intake no other complaints at this time      The following portions of the patient's history were reviewed and updated as appropriate: allergies, current medications, past family history, past medical history, past social history, past surgical history and problem list     Review of Systems   Constitutional: Negative  HENT: Negative  Eyes: Negative  Respiratory: Negative  Cardiovascular: Negative  Gastrointestinal: Negative  Endocrine: Negative  Genitourinary: Negative  Musculoskeletal: Negative  Allergic/Immunologic: Negative  Neurological: Negative  Hematological: Negative  Psychiatric/Behavioral: Negative  All other systems reviewed and are negative  Objective:      /70 (BP Location: Left arm, Patient Position: Sitting, Cuff Size: Standard)   Pulse 71   Temp 97 7 °F (36 5 °C) (Tympanic)   Ht 5' 4 25" (1 632 m)   Wt 99 3 kg (219 lb)   SpO2 99%   BMI 37 30 kg/m²          Physical Exam  Constitutional:       Appearance: She is well-developed  HENT:      Head: Atraumatic  Right Ear: External ear normal       Left Ear: External ear normal    Eyes:      Conjunctiva/sclera: Conjunctivae normal       Pupils: Pupils are equal, round, and reactive to light  Cardiovascular:      Rate and Rhythm: Normal rate and regular rhythm  Heart sounds: Normal heart sounds  Pulmonary:      Effort: Pulmonary effort is normal  No respiratory distress  Breath sounds: Normal breath sounds  Abdominal:      General: There is no distension  Palpations: Abdomen is soft  Tenderness: There is no abdominal tenderness  There is no guarding or rebound  Musculoskeletal:         General: Normal range of motion  Cervical back: Normal range of motion  Skin:     General: Skin is warm and dry  Neurological:      Mental Status: She is alert and oriented to person, place, and time  Cranial Nerves: No cranial nerve deficit  Psychiatric:         Behavior: Behavior normal          Thought Content: Thought content normal          Judgment: Judgment normal          BMI Counseling: Body mass index is 37 3 kg/m²  The BMI is above normal  Nutrition recommendations include encouraging healthy choices of fruits and vegetables  Exercise recommendations include moderate physical activity 150 minutes/week  Rationale for BMI follow-up plan is due to patient being overweight or obese       Depression Screening and Follow-up Plan: Patient was screened for depression during today's encounter  They screened negative with a PHQ-2 score of 0

## 2022-05-10 ENCOUNTER — OFFICE VISIT (OUTPATIENT)
Dept: SURGERY | Facility: CLINIC | Age: 27
End: 2022-05-10

## 2022-05-10 VITALS
WEIGHT: 219 LBS | TEMPERATURE: 97.5 F | HEIGHT: 64 IN | BODY MASS INDEX: 37.39 KG/M2 | OXYGEN SATURATION: 98 % | HEART RATE: 70 BPM

## 2022-05-10 DIAGNOSIS — Z98.890 POST-OPERATIVE STATE: Primary | ICD-10-CM

## 2022-05-10 PROCEDURE — 99024 POSTOP FOLLOW-UP VISIT: CPT | Performed by: SURGERY

## 2022-05-12 NOTE — PROGRESS NOTES
Assessment/Plan:  Status post laparoscopic cholecystectomy, doing well  She can resume diet as tolerated  Lifting and activity restrictions for another 2 weeks  Pathology returned as chronic cholecystitis  Follow-up as needed  No problem-specific Assessment & Plan notes found for this encounter  Diagnoses and all orders for this visit:    Post-operative state          Subjective:      Patient ID: Maximo Leslie is a 27 y o  female  She presents for follow-up after laparoscopic cholecystectomy with combined ERCP for choledocholithiasis  She is doing well, denies any significant pain  She is eating and drinking without difficulty, no nausea or vomiting  Recent LFTs showed T bili of 2 8 down from 6 28      The following portions of the patient's history were reviewed and updated as appropriate: allergies, current medications, past family history, past medical history, past social history, past surgical history and problem list     Review of Systems   All other systems reviewed and are negative  Objective:      Pulse 70   Temp 97 5 °F (36 4 °C) (Tympanic)   Ht 5' 4 25" (1 632 m)   Wt 99 3 kg (219 lb)   SpO2 98%   BMI 37 30 kg/m²          Physical Exam  Vitals reviewed  Constitutional:       General: She is not in acute distress  Appearance: She is obese  Abdominal:       Neurological:      Mental Status: She is alert

## (undated) DEVICE — ENDOPATH ETS45 2.5MM RELOADS (VASCULAR/THIN): Brand: ENDOPATH

## (undated) DEVICE — IRRIG ENDO FLO TUBING

## (undated) DEVICE — SPHINCTEROTOME: Brand: DREAMTOME™ RX 44

## (undated) DEVICE — CHLORAPREP HI-LITE 26ML ORANGE

## (undated) DEVICE — ALLENTOWN LAP CHOLE APP PACK: Brand: CARDINAL HEALTH

## (undated) DEVICE — GLOVE INDICATOR PI UNDERGLOVE SZ 6.5 BLUE

## (undated) DEVICE — TUBING SMOKE EVAC W/FILTRATION DEVICE PLUMEPORT ACTIV

## (undated) DEVICE — RETRIEVAL BALLOON CATHETER: Brand: EXTRACTOR™ PRO RX

## (undated) DEVICE — ENDOPATH 5MM CURVED SCISSORS WITH MONOPOLAR CAUTERY: Brand: ENDOPATH

## (undated) DEVICE — SUT MONOCRYL 4-0 PS-2 27 IN Y426H

## (undated) DEVICE — SUT VICRYL 0 UR-6 27 IN J603H

## (undated) DEVICE — SCD SEQUENTIAL COMPRESSION COMFORT SLEEVE MEDIUM KNEE LENGTH: Brand: KENDALL SCD

## (undated) DEVICE — TROCAR: Brand: KII® SLEEVE

## (undated) DEVICE — LIGACLIP 10-M/L, 10MM ENDOSCOPIC ROTATING MULTIPLE CLIP APPLIERS: Brand: LIGACLIP

## (undated) DEVICE — ADHESIVE SKIN HIGH VISCOSITY EXOFIN 1ML

## (undated) DEVICE — [HIGH FLOW INSUFFLATOR,  DO NOT USE IF PACKAGE IS DAMAGED,  KEEP DRY,  KEEP AWAY FROM SUNLIGHT,  PROTECT FROM HEAT AND RADIOACTIVE SOURCES.]: Brand: PNEUMOSURE

## (undated) DEVICE — ENDOPATH ETS-FLEX45 ARTICULATING ENDOSCOPIC LINEAR CUTTER, NO RELOAD: Brand: ENDOPATH

## (undated) DEVICE — PMI DISPOSABLE PUNCTURE CLOSURE DEVICE / SUTURE GRASPER: Brand: PMI

## (undated) DEVICE — INTENDED FOR TISSUE SEPARATION, AND OTHER PROCEDURES THAT REQUIRE A SHARP SURGICAL BLADE TO PUNCTURE OR CUT.: Brand: BARD-PARKER SAFETY BLADES SIZE 11, STERILE

## (undated) DEVICE — PLUMEPEN PRO 10FT

## (undated) DEVICE — TROCAR: Brand: KII FIOS FIRST ENTRY

## (undated) DEVICE — BLUE HEAT SCOPE WARMER

## (undated) DEVICE — TISSUE RETRIEVAL SYSTEM: Brand: INZII RETRIEVAL SYSTEM

## (undated) DEVICE — GLOVE SRG BIOGEL 6.5

## (undated) DEVICE — 5 MM CURVED DISSECTORS WITH MONOPOLAR CAUTERY: Brand: ENDOPATH

## (undated) DEVICE — SKIN MARKER DUAL TIP WITH RULER CAP, FLEXIBLE RULER AND LABELS: Brand: DEVON

## (undated) DEVICE — ELECTRODE LAP J HOOK SPLIT STEM E-Z CLEAN 33CM -0021S

## (undated) DEVICE — ENDOPATH PNEUMONEEDLE INSUFFLATION NEEDLES WITH LUER LOCK CONNECTORS 120MM: Brand: ENDOPATH